# Patient Record
Sex: FEMALE | Race: WHITE | Employment: PART TIME | URBAN - METROPOLITAN AREA
[De-identification: names, ages, dates, MRNs, and addresses within clinical notes are randomized per-mention and may not be internally consistent; named-entity substitution may affect disease eponyms.]

---

## 2017-05-11 ENCOUNTER — OFFICE VISIT (OUTPATIENT)
Dept: PRIMARY CARE CLINIC | Age: 52
End: 2017-05-11

## 2017-05-11 VITALS
HEART RATE: 60 BPM | WEIGHT: 194 LBS | DIASTOLIC BLOOD PRESSURE: 88 MMHG | RESPIRATION RATE: 14 BRPM | TEMPERATURE: 98.7 F | SYSTOLIC BLOOD PRESSURE: 136 MMHG | HEIGHT: 70 IN | BODY MASS INDEX: 27.77 KG/M2

## 2017-05-11 DIAGNOSIS — R51.9 HEADACHE, UNSPECIFIED HEADACHE TYPE: ICD-10-CM

## 2017-05-11 DIAGNOSIS — I10 ESSENTIAL HYPERTENSION: Primary | ICD-10-CM

## 2017-05-11 DIAGNOSIS — Z12.39 ENCOUNTER FOR BREAST CANCER SCREENING OTHER THAN MAMMOGRAM: ICD-10-CM

## 2017-05-11 DIAGNOSIS — E78.5 DYSLIPIDEMIA: ICD-10-CM

## 2017-05-11 DIAGNOSIS — Z12.11 SCREENING FOR COLON CANCER: ICD-10-CM

## 2017-05-11 PROCEDURE — 99214 OFFICE O/P EST MOD 30 MIN: CPT | Performed by: FAMILY MEDICINE

## 2017-05-11 RX ORDER — RIZATRIPTAN BENZOATE 10 MG/1
10 TABLET ORAL
Qty: 9 TABLET | Refills: 5 | Status: SHIPPED | OUTPATIENT
Start: 2017-05-11 | End: 2017-11-21 | Stop reason: SDUPTHER

## 2017-05-11 RX ORDER — ATENOLOL 50 MG/1
50 TABLET ORAL DAILY
Qty: 90 TABLET | Refills: 1 | Status: SHIPPED | OUTPATIENT
Start: 2017-05-11 | End: 2018-01-16

## 2017-05-11 RX ORDER — RIZATRIPTAN BENZOATE 10 MG/1
TABLET ORAL
Refills: 5 | COMMUNITY
Start: 2017-04-25 | End: 2017-05-11 | Stop reason: SDUPTHER

## 2017-05-11 ASSESSMENT — PATIENT HEALTH QUESTIONNAIRE - PHQ9
SUM OF ALL RESPONSES TO PHQ QUESTIONS 1-9: 0
SUM OF ALL RESPONSES TO PHQ9 QUESTIONS 1 & 2: 0
1. LITTLE INTEREST OR PLEASURE IN DOING THINGS: 0
2. FEELING DOWN, DEPRESSED OR HOPELESS: 0

## 2017-05-11 ASSESSMENT — ENCOUNTER SYMPTOMS
SHORTNESS OF BREATH: 0
BLURRED VISION: 0
ORTHOPNEA: 0

## 2017-05-18 ENCOUNTER — HOSPITAL ENCOUNTER (OUTPATIENT)
Dept: WOMENS IMAGING | Age: 52
Discharge: HOME | End: 2017-05-18
Payer: MEDICAID

## 2017-05-18 DIAGNOSIS — Z12.39 ENCOUNTER FOR BREAST CANCER SCREENING OTHER THAN MAMMOGRAM: ICD-10-CM

## 2017-05-18 PROCEDURE — G0202 SCR MAMMO BI INCL CAD: HCPCS

## 2017-05-23 DIAGNOSIS — N63.0 BREAST NODULE: Primary | ICD-10-CM

## 2017-05-24 DIAGNOSIS — I10 ESSENTIAL HYPERTENSION: ICD-10-CM

## 2017-05-24 LAB
ALBUMIN SERPL-MCNC: 4.3 G/DL (ref 3.9–4.9)
ALP BLD-CCNC: 57 U/L (ref 40–130)
ALT SERPL-CCNC: 24 U/L (ref 0–33)
ANION GAP SERPL CALCULATED.3IONS-SCNC: 12 MEQ/L (ref 7–13)
AST SERPL-CCNC: 37 U/L (ref 0–35)
BASOPHILS ABSOLUTE: 0 K/UL (ref 0–0.2)
BASOPHILS RELATIVE PERCENT: 0.6 %
BILIRUB SERPL-MCNC: 0.9 MG/DL (ref 0–1.2)
BUN BLDV-MCNC: 8 MG/DL (ref 6–20)
CALCIUM SERPL-MCNC: 9 MG/DL (ref 8.6–10.2)
CHLORIDE BLD-SCNC: 102 MEQ/L (ref 98–107)
CHOLESTEROL, TOTAL: 215 MG/DL (ref 0–199)
CO2: 25 MEQ/L (ref 22–29)
CREAT SERPL-MCNC: 0.41 MG/DL (ref 0.5–0.9)
EOSINOPHILS ABSOLUTE: 0.2 K/UL (ref 0–0.7)
EOSINOPHILS RELATIVE PERCENT: 2.8 %
GFR AFRICAN AMERICAN: >60
GFR NON-AFRICAN AMERICAN: >60
GLOBULIN: 2.9 G/DL (ref 2.3–3.5)
GLUCOSE BLD-MCNC: 87 MG/DL (ref 74–109)
HCT VFR BLD CALC: 43.4 % (ref 37–47)
HDLC SERPL-MCNC: 52 MG/DL (ref 40–59)
HEMOGLOBIN: 13.8 G/DL (ref 12–16)
LDL CHOLESTEROL CALCULATED: 138 MG/DL (ref 0–129)
LYMPHOCYTES ABSOLUTE: 1.5 K/UL (ref 1–4.8)
LYMPHOCYTES RELATIVE PERCENT: 23.6 %
MCH RBC QN AUTO: 30.7 PG (ref 27–31.3)
MCHC RBC AUTO-ENTMCNC: 31.7 % (ref 33–37)
MCV RBC AUTO: 96.9 FL (ref 82–100)
MONOCYTES ABSOLUTE: 0.5 K/UL (ref 0.2–0.8)
MONOCYTES RELATIVE PERCENT: 7.3 %
NEUTROPHILS ABSOLUTE: 4.1 K/UL (ref 1.4–6.5)
NEUTROPHILS RELATIVE PERCENT: 65.7 %
PDW BLD-RTO: 12.4 % (ref 11.5–14.5)
PLATELET # BLD: 185 K/UL (ref 130–400)
POTASSIUM SERPL-SCNC: 3.9 MEQ/L (ref 3.5–5.1)
RBC # BLD: 4.48 M/UL (ref 4.2–5.4)
SODIUM BLD-SCNC: 139 MEQ/L (ref 132–144)
TOTAL PROTEIN: 7.2 G/DL (ref 6.4–8.1)
TRIGL SERPL-MCNC: 126 MG/DL (ref 0–200)
TSH SERPL DL<=0.05 MIU/L-ACNC: 2.78 UIU/ML (ref 0.27–4.2)
WBC # BLD: 6.2 K/UL (ref 4.8–10.8)

## 2017-05-31 ENCOUNTER — HOSPITAL ENCOUNTER (OUTPATIENT)
Dept: ULTRASOUND IMAGING | Age: 52
Discharge: HOME | End: 2017-05-31
Payer: MEDICAID

## 2017-05-31 ENCOUNTER — HOSPITAL ENCOUNTER (OUTPATIENT)
Dept: WOMENS IMAGING | Age: 52
Discharge: HOME | End: 2017-05-31
Payer: MEDICAID

## 2017-05-31 DIAGNOSIS — R92.8 ABNORMAL MAMMOGRAM: ICD-10-CM

## 2017-05-31 DIAGNOSIS — N63.0 BREAST NODULE: ICD-10-CM

## 2017-05-31 PROCEDURE — 76642 ULTRASOUND BREAST LIMITED: CPT

## 2017-05-31 PROCEDURE — G0279 TOMOSYNTHESIS, MAMMO: HCPCS

## 2017-06-12 DIAGNOSIS — N60.01 BREAST CYST, RIGHT: Primary | ICD-10-CM

## 2017-07-10 ENCOUNTER — OFFICE VISIT (OUTPATIENT)
Dept: SURGERY | Age: 52
End: 2017-07-10

## 2017-07-10 VITALS
TEMPERATURE: 98.6 F | BODY MASS INDEX: 27.2 KG/M2 | HEIGHT: 70 IN | WEIGHT: 190 LBS | SYSTOLIC BLOOD PRESSURE: 140 MMHG | DIASTOLIC BLOOD PRESSURE: 90 MMHG

## 2017-07-10 DIAGNOSIS — N60.09 BREAST CYST, UNSPECIFIED LATERALITY: Primary | ICD-10-CM

## 2017-07-10 PROCEDURE — 99212 OFFICE O/P EST SF 10 MIN: CPT | Performed by: SURGERY

## 2017-09-14 ENCOUNTER — TELEPHONE (OUTPATIENT)
Dept: PRIMARY CARE CLINIC | Age: 52
End: 2017-09-14

## 2017-09-15 RX ORDER — METOPROLOL TARTRATE 50 MG/1
50 TABLET, FILM COATED ORAL 2 TIMES DAILY
Qty: 60 TABLET | Refills: 3 | Status: SHIPPED | OUTPATIENT
Start: 2017-09-15 | End: 2018-01-16 | Stop reason: SDUPTHER

## 2017-11-21 RX ORDER — RIZATRIPTAN BENZOATE 10 MG/1
10 TABLET ORAL
Qty: 9 TABLET | Refills: 5 | Status: SHIPPED | OUTPATIENT
Start: 2017-11-21 | End: 2018-01-16 | Stop reason: SDUPTHER

## 2017-11-21 RX ORDER — RIZATRIPTAN BENZOATE 10 MG/1
TABLET ORAL
Qty: 9 TABLET | Refills: 0 | OUTPATIENT
Start: 2017-11-21

## 2018-01-16 ENCOUNTER — OFFICE VISIT (OUTPATIENT)
Dept: PRIMARY CARE CLINIC | Age: 53
End: 2018-01-16

## 2018-01-16 VITALS
BODY MASS INDEX: 27.92 KG/M2 | RESPIRATION RATE: 12 BRPM | WEIGHT: 195 LBS | DIASTOLIC BLOOD PRESSURE: 82 MMHG | HEART RATE: 60 BPM | TEMPERATURE: 98.5 F | SYSTOLIC BLOOD PRESSURE: 134 MMHG | HEIGHT: 70 IN

## 2018-01-16 DIAGNOSIS — L05.01 PILONIDAL CYST WITH ABSCESS: ICD-10-CM

## 2018-01-16 DIAGNOSIS — L05.01 PILONIDAL CYST WITH ABSCESS: Primary | ICD-10-CM

## 2018-01-16 DIAGNOSIS — L03.317 CELLULITIS OF BUTTOCK: ICD-10-CM

## 2018-01-16 DIAGNOSIS — Z12.11 SCREENING FOR COLON CANCER: ICD-10-CM

## 2018-01-16 PROCEDURE — 3017F COLORECTAL CA SCREEN DOC REV: CPT | Performed by: FAMILY MEDICINE

## 2018-01-16 PROCEDURE — 99214 OFFICE O/P EST MOD 30 MIN: CPT | Performed by: FAMILY MEDICINE

## 2018-01-16 PROCEDURE — 1036F TOBACCO NON-USER: CPT | Performed by: FAMILY MEDICINE

## 2018-01-16 PROCEDURE — G8484 FLU IMMUNIZE NO ADMIN: HCPCS | Performed by: FAMILY MEDICINE

## 2018-01-16 PROCEDURE — 3014F SCREEN MAMMO DOC REV: CPT | Performed by: FAMILY MEDICINE

## 2018-01-16 PROCEDURE — G8427 DOCREV CUR MEDS BY ELIG CLIN: HCPCS | Performed by: FAMILY MEDICINE

## 2018-01-16 PROCEDURE — G8419 CALC BMI OUT NRM PARAM NOF/U: HCPCS | Performed by: FAMILY MEDICINE

## 2018-01-16 RX ORDER — CIPROFLOXACIN 500 MG/1
500 TABLET, FILM COATED ORAL 2 TIMES DAILY
Qty: 20 TABLET | Refills: 0 | Status: SHIPPED | OUTPATIENT
Start: 2018-01-16 | End: 2018-01-26

## 2018-01-16 RX ORDER — RIZATRIPTAN BENZOATE 10 MG/1
10 TABLET ORAL
Qty: 9 TABLET | Refills: 5 | Status: SHIPPED | OUTPATIENT
Start: 2018-01-16 | End: 2018-12-03 | Stop reason: SDUPTHER

## 2018-01-16 RX ORDER — METOPROLOL TARTRATE 50 MG/1
50 TABLET, FILM COATED ORAL 2 TIMES DAILY
Qty: 60 TABLET | Refills: 5 | Status: SHIPPED | OUTPATIENT
Start: 2018-01-16 | End: 2018-07-20 | Stop reason: SDUPTHER

## 2018-01-16 ASSESSMENT — ENCOUNTER SYMPTOMS
COLOR CHANGE: 0
NAUSEA: 0
CONSTIPATION: 0
CHOKING: 0
WHEEZING: 0
PHOTOPHOBIA: 0
HEMATEMESIS: 0
STRIDOR: 0
EYE DISCHARGE: 0
EYE REDNESS: 0
CHEST TIGHTNESS: 0
ABDOMINAL PAIN: 0
RECTAL PAIN: 1
DIFFICULTY BREATHING: 0
EYE PAIN: 0
DIARRHEA: 0
HEMATOCHEZIA: 1
VOMITING: 0
COUGH: 0
APNEA: 0
FACIAL SWELLING: 0

## 2018-01-16 NOTE — PROGRESS NOTES
Subjective:      Patient ID: Leonard Isaacs is a 47 y.o. female who presents today for:  Chief Complaint   Patient presents with    Hemorrhoids     Possible external hemorrhoid since Saturday, 1/13/18. She had bleeding yesterday but didn't have it before then. She admits to: diarrhea (1 week ago). She admits to pain & swelling. States that it was very swollen Sunday & yesterday. After she had blood yesterday she states that the swelling went down some. She rates the pain today as: 4/10. Treatments: preparation H & tylenol - no relief. No PMH of hemorrhoids.  Health Maintenance     Has never had a Colonoscopy. States that after the hemorrhoids are resolved she is agreeable to get one. Refuses at this time. Rectal Bleeding    The current episode started 3 to 5 days ago. The onset was sudden. The problem occurs rarely. The problem has been gradually improving. The pain is moderate. The stool is described as hard. There was no prior successful therapy. Ineffective treatments: Preparation H, Tylenol. Associated symptoms include hemorrhoids (?) and rectal pain. Pertinent negatives include no anorexia, no fever, no abdominal pain, no diarrhea, no hematemesis, no nausea, no vomiting, no hematuria, no vaginal bleeding, no vaginal discharge, no chest pain, no headaches, no coughing, no difficulty breathing and no rash. Associated symptoms comments: constipation. She has been behaving normally. She has been eating and drinking normally. Urine output has been normal. There were no sick contacts.          Past Medical History:   Diagnosis Date    Allergic rhinitis 4/9/2014    Breast cyst 4/27/2015    Calculus of gallbladder without cholecystitis without obstruction 5/5/2016    Cellulitis of buttock 1/16/2018    Diarrhea, after Zpac 5/5/2014    Dyslipidemia 3/12/2015    Fibrocystic breast disease 4/27/2015    Headache(784.0)     Hematuria 6/26/2015    Hematuria, persistent 8/20/2015    Hypertension     Insomnia 3/12/2015    Pilonidal cyst with abscess, spontaneous D/C 1/16/2018    Pyuria 6/26/2015    Sinusitis 4/9/2014    UTI (urinary tract infection) 6/26/2015     Past Surgical History:   Procedure Laterality Date    APPENDECTOMY  2012    CYSTOSCOPY  10/21/15    FLEXI BLE    HYSTERECTOMY  2012     Family History   Problem Relation Age of Onset    High Blood Pressure Mother     High Blood Pressure Father      Social History     Social History    Marital status:      Spouse name: N/A    Number of children: N/A    Years of education: N/A     Occupational History    Not on file. Social History Main Topics    Smoking status: Never Smoker    Smokeless tobacco: Never Used    Alcohol use 0.0 oz/week      Comment: RARE    Drug use: No    Sexual activity: Yes     Other Topics Concern    Not on file     Social History Narrative    No narrative on file     Allergies:  Review of patient's allergies indicates no known allergies. Review of Systems   Constitutional: Negative for activity change, appetite change, chills, diaphoresis and fever. HENT: Negative for congestion, ear discharge, ear pain, facial swelling, hearing loss and mouth sores. Eyes: Negative for photophobia, pain, discharge and redness. Respiratory: Negative for apnea, cough, choking, chest tightness, wheezing and stridor. Cardiovascular: Negative for chest pain, palpitations and leg swelling. Gastrointestinal: Positive for hematochezia, hemorrhoids (?) and rectal pain. Negative for abdominal pain, anorexia, constipation, diarrhea, hematemesis, nausea and vomiting. Endocrine: Negative for cold intolerance, heat intolerance, polydipsia and polyphagia. Genitourinary: Negative for dysuria, frequency, hematuria, vaginal bleeding and vaginal discharge. Musculoskeletal: Negative for gait problem, joint swelling, neck pain and neck stiffness. Skin: Negative for color change, pallor, rash and wound. tablet by mouth 2 times daily     Dispense:  60 tablet     Refill:  5    ciprofloxacin (CIPRO) 500 MG tablet     Sig: Take 1 tablet by mouth 2 times daily for 10 days     Dispense:  20 tablet     Refill:  0       Controlled Substances Monitoring:      No Follow-up on file. I, Cherie Haider CMA   , am scribing for and in the presence of Cam Ahn DO. Electronically signed by :  Cherie Grajeda DO, personally performed the services described in this documentation, as scribed by Debby Luong CMA   in my presence, and it is both accurate and complete.  Electronically signed by: Cam Ahn DO    1/16/18 11:23 AM    Cam Ahn DO

## 2018-01-18 ENCOUNTER — OFFICE VISIT (OUTPATIENT)
Dept: PRIMARY CARE CLINIC | Age: 53
End: 2018-01-18

## 2018-01-18 VITALS
BODY MASS INDEX: 27.92 KG/M2 | HEART RATE: 60 BPM | RESPIRATION RATE: 14 BRPM | TEMPERATURE: 97.8 F | WEIGHT: 195 LBS | DIASTOLIC BLOOD PRESSURE: 90 MMHG | SYSTOLIC BLOOD PRESSURE: 150 MMHG | HEIGHT: 70 IN

## 2018-01-18 DIAGNOSIS — L03.317 CELLULITIS AND ABSCESS OF BUTTOCK: ICD-10-CM

## 2018-01-18 DIAGNOSIS — L05.91 PILONIDAL CYST: Primary | ICD-10-CM

## 2018-01-18 DIAGNOSIS — L02.31 CELLULITIS AND ABSCESS OF BUTTOCK: ICD-10-CM

## 2018-01-18 LAB
GRAM STAIN RESULT: ABNORMAL
ORGANISM: ABNORMAL
WOUND/ABSCESS: ABNORMAL
WOUND/ABSCESS: ABNORMAL

## 2018-01-18 PROCEDURE — 99213 OFFICE O/P EST LOW 20 MIN: CPT | Performed by: FAMILY MEDICINE

## 2018-01-18 PROCEDURE — 3014F SCREEN MAMMO DOC REV: CPT | Performed by: FAMILY MEDICINE

## 2018-01-18 PROCEDURE — G8427 DOCREV CUR MEDS BY ELIG CLIN: HCPCS | Performed by: FAMILY MEDICINE

## 2018-01-18 PROCEDURE — 1036F TOBACCO NON-USER: CPT | Performed by: FAMILY MEDICINE

## 2018-01-18 PROCEDURE — 3017F COLORECTAL CA SCREEN DOC REV: CPT | Performed by: FAMILY MEDICINE

## 2018-01-18 PROCEDURE — G8484 FLU IMMUNIZE NO ADMIN: HCPCS | Performed by: FAMILY MEDICINE

## 2018-01-18 PROCEDURE — G8419 CALC BMI OUT NRM PARAM NOF/U: HCPCS | Performed by: FAMILY MEDICINE

## 2018-01-18 ASSESSMENT — ENCOUNTER SYMPTOMS
EYE REDNESS: 0
CHOKING: 0
CHEST TIGHTNESS: 0
FACIAL SWELLING: 0
STRIDOR: 0
APNEA: 0
COLOR CHANGE: 0
WHEEZING: 0
EYE DISCHARGE: 0
PHOTOPHOBIA: 0
NAUSEA: 0
ABDOMINAL PAIN: 0
EYE PAIN: 0
DIARRHEA: 0
CONSTIPATION: 0
RECTAL PAIN: 1

## 2018-01-18 NOTE — PROGRESS NOTES
Review of patient's allergies indicates no known allergies. Review of Systems   Constitutional: Negative for activity change, appetite change, chills, diaphoresis and fever. HENT: Negative for congestion, ear discharge, ear pain, facial swelling, hearing loss and mouth sores. Eyes: Negative for photophobia, pain, discharge and redness. Respiratory: Negative for apnea, choking, chest tightness, wheezing and stridor. Cardiovascular: Negative for chest pain, palpitations and leg swelling. Gastrointestinal: Positive for rectal pain (improved). Negative for abdominal pain, constipation, diarrhea and nausea. Endocrine: Negative for cold intolerance, heat intolerance, polydipsia and polyphagia. Genitourinary: Negative for dysuria and frequency. Musculoskeletal: Negative for gait problem, joint swelling, neck pain and neck stiffness. Skin: Negative for color change, pallor, rash and wound. Allergic/Immunologic: Negative for immunocompromised state. Neurological: Negative for tremors, syncope, facial asymmetry, speech difficulty and headaches. Psychiatric/Behavioral: Negative for confusion and hallucinations. The patient is not nervous/anxious and is not hyperactive. Objective:   BP (!) 150/90 (Site: Left Arm, Position: Sitting, Cuff Size: Medium Adult)   Pulse 60   Temp 97.8 °F (36.6 °C) (Oral)   Resp 14   Ht 5' 10\" (1.778 m)   Wt 195 lb (88.5 kg)   LMP 01/02/2012   Breastfeeding? No   BMI 27.98 kg/m²     Physical Exam   Constitutional: She is oriented to person, place, and time. She appears well-developed and well-nourished. HENT:   Head: Normocephalic and atraumatic. Right Ear: External ear normal.   Left Ear: External ear normal.   Eyes: Conjunctivae and EOM are normal. Pupils are equal, round, and reactive to light. Neck: Normal range of motion. Neck supple. Genitourinary:         Neurological: She is alert and oriented to person, place, and time.    Skin: Skin is warm

## 2018-02-01 ENCOUNTER — OFFICE VISIT (OUTPATIENT)
Dept: PRIMARY CARE CLINIC | Age: 53
End: 2018-02-01
Payer: MEDICAID

## 2018-02-01 VITALS
SYSTOLIC BLOOD PRESSURE: 138 MMHG | DIASTOLIC BLOOD PRESSURE: 88 MMHG | BODY MASS INDEX: 28.06 KG/M2 | RESPIRATION RATE: 14 BRPM | WEIGHT: 196 LBS | HEIGHT: 70 IN | TEMPERATURE: 98.1 F | HEART RATE: 56 BPM

## 2018-02-01 DIAGNOSIS — L05.91 PILONIDAL CYST: Primary | ICD-10-CM

## 2018-02-01 DIAGNOSIS — L05.01 PILONIDAL CYST WITH ABSCESS: ICD-10-CM

## 2018-02-01 PROCEDURE — 3017F COLORECTAL CA SCREEN DOC REV: CPT | Performed by: FAMILY MEDICINE

## 2018-02-01 PROCEDURE — G8419 CALC BMI OUT NRM PARAM NOF/U: HCPCS | Performed by: FAMILY MEDICINE

## 2018-02-01 PROCEDURE — 99213 OFFICE O/P EST LOW 20 MIN: CPT | Performed by: FAMILY MEDICINE

## 2018-02-01 PROCEDURE — G8427 DOCREV CUR MEDS BY ELIG CLIN: HCPCS | Performed by: FAMILY MEDICINE

## 2018-02-01 PROCEDURE — G8484 FLU IMMUNIZE NO ADMIN: HCPCS | Performed by: FAMILY MEDICINE

## 2018-02-01 PROCEDURE — 3014F SCREEN MAMMO DOC REV: CPT | Performed by: FAMILY MEDICINE

## 2018-02-01 PROCEDURE — 1036F TOBACCO NON-USER: CPT | Performed by: FAMILY MEDICINE

## 2018-02-01 ASSESSMENT — ENCOUNTER SYMPTOMS
EYE PAIN: 0
APNEA: 0
ABDOMINAL PAIN: 0
FACIAL SWELLING: 0
STRIDOR: 0
PHOTOPHOBIA: 0
CHOKING: 0
EYE DISCHARGE: 0
DIARRHEA: 0
COLOR CHANGE: 0
WHEEZING: 0
NAUSEA: 0
CHEST TIGHTNESS: 0
CONSTIPATION: 0
EYE REDNESS: 0

## 2018-02-01 NOTE — PROGRESS NOTES
change, appetite change, chills, diaphoresis and fever. HENT: Negative for congestion, ear discharge, ear pain, facial swelling, hearing loss and mouth sores. Eyes: Negative for photophobia, pain, discharge and redness. Respiratory: Negative for apnea, choking, chest tightness, wheezing and stridor. Cardiovascular: Negative for chest pain, palpitations and leg swelling. Gastrointestinal: Negative for abdominal pain, constipation, diarrhea and nausea. Endocrine: Negative for cold intolerance, heat intolerance, polydipsia and polyphagia. Genitourinary: Negative for dysuria and frequency. Musculoskeletal: Negative for gait problem, joint swelling, neck pain and neck stiffness. Skin: Negative for color change, pallor, rash and wound. Allergic/Immunologic: Negative for immunocompromised state. Neurological: Negative for tremors, syncope, facial asymmetry, speech difficulty and headaches. Psychiatric/Behavioral: Negative for confusion and hallucinations. The patient is not nervous/anxious and is not hyperactive. Objective:   /88 (Site: Left Arm, Position: Sitting, Cuff Size: Large Adult)   Pulse 56   Temp 98.1 °F (36.7 °C)   Resp 14   Ht 5' 10\" (1.778 m)   Wt 196 lb (88.9 kg)   LMP 01/02/2012   BMI 28.12 kg/m²     Physical Exam   Constitutional: She is oriented to person, place, and time. She appears well-developed and well-nourished. HENT:   Head: Normocephalic and atraumatic. Nose: Nose normal.   Eyes: Conjunctivae and EOM are normal. Pupils are equal, round, and reactive to light. No scleral icterus. Neck: Normal range of motion. Neck supple. Cardiovascular: Normal rate, regular rhythm and normal heart sounds. Exam reveals no gallop and no friction rub. No murmur heard. Pulmonary/Chest: Effort normal and breath sounds normal. No respiratory distress. She has no wheezes. She has no rales. She exhibits no tenderness.    Neurological: She is alert and oriented to person, place, and time. Skin: Skin is warm and dry. No rash noted. No erythema. No pallor. Psychiatric: She has a normal mood and affect. Her behavior is normal. Judgment normal.   Nursing note and vitals reviewed. Assessment:     1. Pilonidal cyst, Resolved     2. Pilonidal cyst with abscess, spontaneous D/C, Resolved         Plan:      No orders of the defined types were placed in this encounter. No orders of the defined types were placed in this encounter. Controlled Substances Monitoring:      No Follow-up on file. ISilvestre CMA   , am scribing for and in the presence of Carina Gallardo DO. Electronically signed by :  Silvestre Coulter DO, personally performed the services described in this documentation, as scribed by Barrington Arboleda CMA   in my presence, and it is both accurate and complete.  Electronically signed by: Carina Gallardo DO    2/1/18 2:55 PM    Carina Gallardo DO

## 2018-05-29 ENCOUNTER — HOSPITAL ENCOUNTER (OUTPATIENT)
Dept: WOMENS IMAGING | Age: 53
Discharge: HOME | End: 2018-05-31
Payer: MEDICAID

## 2018-05-29 DIAGNOSIS — Z12.39 SCREENING BREAST EXAMINATION: ICD-10-CM

## 2018-05-29 PROCEDURE — 77067 SCR MAMMO BI INCL CAD: CPT

## 2018-07-23 RX ORDER — METOPROLOL TARTRATE 50 MG/1
TABLET, FILM COATED ORAL
Qty: 60 TABLET | Refills: 0 | Status: SHIPPED | OUTPATIENT
Start: 2018-07-23 | End: 2018-08-28 | Stop reason: SDUPTHER

## 2018-08-20 RX ORDER — METOPROLOL TARTRATE 50 MG/1
TABLET, FILM COATED ORAL
Qty: 60 TABLET | Refills: 0 | OUTPATIENT
Start: 2018-08-20

## 2018-08-28 ENCOUNTER — OFFICE VISIT (OUTPATIENT)
Dept: PRIMARY CARE CLINIC | Age: 53
End: 2018-08-28
Payer: MEDICAID

## 2018-08-28 VITALS
BODY MASS INDEX: 27.35 KG/M2 | RESPIRATION RATE: 16 BRPM | DIASTOLIC BLOOD PRESSURE: 78 MMHG | WEIGHT: 191 LBS | HEART RATE: 60 BPM | HEIGHT: 70 IN | TEMPERATURE: 98.7 F | SYSTOLIC BLOOD PRESSURE: 120 MMHG

## 2018-08-28 DIAGNOSIS — E78.5 DYSLIPIDEMIA: ICD-10-CM

## 2018-08-28 DIAGNOSIS — I10 ESSENTIAL HYPERTENSION: ICD-10-CM

## 2018-08-28 DIAGNOSIS — I10 ESSENTIAL HYPERTENSION: Primary | ICD-10-CM

## 2018-08-28 LAB
ALBUMIN SERPL-MCNC: 4.5 G/DL (ref 3.9–4.9)
ALP BLD-CCNC: 71 U/L (ref 40–130)
ALT SERPL-CCNC: 26 U/L (ref 0–33)
ANION GAP SERPL CALCULATED.3IONS-SCNC: 13 MEQ/L (ref 7–13)
AST SERPL-CCNC: 22 U/L (ref 0–35)
BASOPHILS ABSOLUTE: 0 K/UL (ref 0–0.2)
BASOPHILS RELATIVE PERCENT: 0.6 %
BILIRUB SERPL-MCNC: 1 MG/DL (ref 0–1.2)
BUN BLDV-MCNC: 10 MG/DL (ref 6–20)
CALCIUM SERPL-MCNC: 9.1 MG/DL (ref 8.6–10.2)
CHLORIDE BLD-SCNC: 102 MEQ/L (ref 98–107)
CHOLESTEROL, TOTAL: 211 MG/DL (ref 0–199)
CO2: 26 MEQ/L (ref 22–29)
CREAT SERPL-MCNC: 0.44 MG/DL (ref 0.5–0.9)
EOSINOPHILS ABSOLUTE: 0.1 K/UL (ref 0–0.7)
EOSINOPHILS RELATIVE PERCENT: 1.5 %
GFR AFRICAN AMERICAN: >60
GFR NON-AFRICAN AMERICAN: >60
GLOBULIN: 2.9 G/DL (ref 2.3–3.5)
GLUCOSE BLD-MCNC: 93 MG/DL (ref 74–109)
HCT VFR BLD CALC: 39.3 % (ref 37–47)
HDLC SERPL-MCNC: 45 MG/DL (ref 40–59)
HEMOGLOBIN: 13.4 G/DL (ref 12–16)
LDL CHOLESTEROL CALCULATED: 124 MG/DL (ref 0–129)
LYMPHOCYTES ABSOLUTE: 1.8 K/UL (ref 1–4.8)
LYMPHOCYTES RELATIVE PERCENT: 25.7 %
MCH RBC QN AUTO: 32.4 PG (ref 27–31.3)
MCHC RBC AUTO-ENTMCNC: 34.1 % (ref 33–37)
MCV RBC AUTO: 94.9 FL (ref 82–100)
MONOCYTES ABSOLUTE: 0.4 K/UL (ref 0.2–0.8)
MONOCYTES RELATIVE PERCENT: 6.4 %
NEUTROPHILS ABSOLUTE: 4.5 K/UL (ref 1.4–6.5)
NEUTROPHILS RELATIVE PERCENT: 65.8 %
PDW BLD-RTO: 13.2 % (ref 11.5–14.5)
PLATELET # BLD: 204 K/UL (ref 130–400)
POTASSIUM SERPL-SCNC: 3.6 MEQ/L (ref 3.5–5.1)
RBC # BLD: 4.14 M/UL (ref 4.2–5.4)
SODIUM BLD-SCNC: 141 MEQ/L (ref 132–144)
TOTAL PROTEIN: 7.4 G/DL (ref 6.4–8.1)
TRIGL SERPL-MCNC: 210 MG/DL (ref 0–200)
TSH SERPL DL<=0.05 MIU/L-ACNC: 2.77 UIU/ML (ref 0.27–4.2)
WBC # BLD: 6.8 K/UL (ref 4.8–10.8)

## 2018-08-28 PROCEDURE — 3017F COLORECTAL CA SCREEN DOC REV: CPT | Performed by: FAMILY MEDICINE

## 2018-08-28 PROCEDURE — G8419 CALC BMI OUT NRM PARAM NOF/U: HCPCS | Performed by: FAMILY MEDICINE

## 2018-08-28 PROCEDURE — 99213 OFFICE O/P EST LOW 20 MIN: CPT | Performed by: FAMILY MEDICINE

## 2018-08-28 PROCEDURE — G8427 DOCREV CUR MEDS BY ELIG CLIN: HCPCS | Performed by: FAMILY MEDICINE

## 2018-08-28 PROCEDURE — 1036F TOBACCO NON-USER: CPT | Performed by: FAMILY MEDICINE

## 2018-08-28 RX ORDER — METOPROLOL TARTRATE 50 MG/1
TABLET, FILM COATED ORAL
Qty: 60 TABLET | Refills: 5 | Status: SHIPPED | OUTPATIENT
Start: 2018-08-28 | End: 2019-03-13 | Stop reason: SDUPTHER

## 2018-08-28 ASSESSMENT — ENCOUNTER SYMPTOMS
CHOKING: 0
EYE DISCHARGE: 0
COLOR CHANGE: 0
STRIDOR: 0
EYE REDNESS: 0
NAUSEA: 0
CHEST TIGHTNESS: 0
BLURRED VISION: 0
ABDOMINAL PAIN: 0
SHORTNESS OF BREATH: 0
EYE PAIN: 0
FACIAL SWELLING: 0
WHEEZING: 0
CONSTIPATION: 0
DIARRHEA: 0
ORTHOPNEA: 0
APNEA: 0
PHOTOPHOBIA: 0

## 2018-08-28 ASSESSMENT — PATIENT HEALTH QUESTIONNAIRE - PHQ9
SUM OF ALL RESPONSES TO PHQ QUESTIONS 1-9: 0
1. LITTLE INTEREST OR PLEASURE IN DOING THINGS: 0
SUM OF ALL RESPONSES TO PHQ QUESTIONS 1-9: 0
2. FEELING DOWN, DEPRESSED OR HOPELESS: 0
SUM OF ALL RESPONSES TO PHQ9 QUESTIONS 1 & 2: 0

## 2018-08-28 NOTE — PROGRESS NOTES
 Not on file. Social History Main Topics    Smoking status: Never Smoker    Smokeless tobacco: Never Used    Alcohol use 0.0 oz/week      Comment: RARE    Drug use: No    Sexual activity: Yes     Other Topics Concern    Not on file     Social History Narrative    No narrative on file     Allergies:  Patient has no known allergies. Review of Systems   Constitutional: Negative for activity change, appetite change, chills, diaphoresis, fatigue, fever and malaise/fatigue. HENT: Negative for congestion, ear discharge, ear pain, facial swelling, hearing loss and mouth sores. Eyes: Negative for blurred vision, photophobia, pain, discharge and redness. Respiratory: Negative for apnea, choking, chest tightness, shortness of breath, wheezing and stridor. Cardiovascular: Negative for chest pain, palpitations, orthopnea, leg swelling and PND. Gastrointestinal: Negative for abdominal pain, constipation, diarrhea and nausea. Endocrine: Negative for cold intolerance, heat intolerance, polydipsia and polyphagia. Genitourinary: Negative for dysuria and frequency. Musculoskeletal: Negative for gait problem, joint swelling, neck pain and neck stiffness. Skin: Negative for color change, pallor, rash and wound. Allergic/Immunologic: Negative for immunocompromised state. Neurological: Negative for tremors, syncope, facial asymmetry, speech difficulty and headaches. Psychiatric/Behavioral: Negative for confusion and hallucinations. The patient is not nervous/anxious and is not hyperactive. Objective:   /78 (Site: Right Arm, Position: Sitting, Cuff Size: Medium Adult)   Pulse 60   Temp 98.7 °F (37.1 °C) (Oral)   Resp 16   Ht 5' 10\" (1.778 m)   Wt 191 lb (86.6 kg)   LMP 01/02/2012   BMI 27.41 kg/m²     Physical Exam   Constitutional: She is oriented to person, place, and time. She appears well-developed and well-nourished. HENT:   Head: Normocephalic and atraumatic.    Right Ear: Hearing, tympanic membrane, external ear and ear canal normal. No drainage or tenderness. Left Ear: Hearing, tympanic membrane, external ear and ear canal normal. No drainage. Mouth/Throat: Oropharynx is clear and moist. No oropharyngeal exudate. Eyes: Pupils are equal, round, and reactive to light. Conjunctivae and EOM are normal. Right eye exhibits no discharge. Left eye exhibits no discharge. No scleral icterus. Neck: Normal range of motion. Neck supple. No tracheal deviation present. No thyromegaly present. Cardiovascular: Normal rate, regular rhythm, normal heart sounds and intact distal pulses. Exam reveals no gallop and no friction rub. No murmur heard. Pulmonary/Chest: Effort normal and breath sounds normal. No respiratory distress. She has no wheezes. She has no rales. She exhibits no tenderness. Abdominal: Soft. Bowel sounds are normal. She exhibits no distension and no mass. There is no tenderness. There is no rebound and no guarding. Musculoskeletal: She exhibits no edema. Lymphadenopathy:     She has no cervical adenopathy. Neurological: She is alert and oriented to person, place, and time. Coordination normal.   Skin: Skin is warm and dry. No rash noted. She is not diaphoretic. No erythema. Psychiatric: She has a normal mood and affect. Her behavior is normal. Judgment and thought content normal.   Nursing note and vitals reviewed. Assessment:      Diagnosis Orders   1. Essential hypertension  metoprolol tartrate (LOPRESSOR) 50 MG tablet    CBC Auto Differential    Comprehensive Metabolic Panel    Lipid Panel    TSH without Reflex   2.  Dyslipidemia  Lipid Panel       Plan:      Orders Placed This Encounter   Procedures    CBC Auto Differential     Standing Status:   Future     Standing Expiration Date:   8/28/2019    Comprehensive Metabolic Panel     Standing Status:   Future     Standing Expiration Date:   8/28/2019    Lipid Panel     Standing Status:   Future

## 2018-12-04 RX ORDER — RIZATRIPTAN BENZOATE 10 MG/1
TABLET ORAL
Qty: 9 TABLET | Refills: 3 | Status: SHIPPED | OUTPATIENT
Start: 2018-12-04

## 2019-03-13 DIAGNOSIS — I10 ESSENTIAL HYPERTENSION: ICD-10-CM

## 2019-03-14 RX ORDER — METOPROLOL TARTRATE 50 MG/1
TABLET, FILM COATED ORAL
Qty: 60 TABLET | Refills: 5 | Status: SHIPPED | OUTPATIENT
Start: 2019-03-14

## 2019-05-06 ENCOUNTER — TELEPHONE (OUTPATIENT)
Dept: PRIMARY CARE CLINIC | Age: 54
End: 2019-05-06

## 2019-05-06 NOTE — TELEPHONE ENCOUNTER
Spoke with patient. Will call back to schedule appointment. Patient eligible for AWV. Patient cannot follow  to [facility] due to insurance coverage.

## 2022-04-05 ENCOUNTER — HOSPITAL ENCOUNTER (OUTPATIENT)
Dept: PHYSICAL THERAPY | Age: 57
Setting detail: THERAPIES SERIES
Discharge: HOME | End: 2022-04-05
Payer: MEDICAID

## 2022-04-05 PROCEDURE — 97162 PT EVAL MOD COMPLEX 30 MIN: CPT

## 2022-04-05 ASSESSMENT — PAIN SCALES - GENERAL: PAINLEVEL_OUTOF10: 5

## 2022-04-05 ASSESSMENT — PAIN DESCRIPTION - DESCRIPTORS: DESCRIPTORS: ACHING

## 2022-04-05 ASSESSMENT — PAIN DESCRIPTION - ONSET: ONSET: SUDDEN

## 2022-04-05 ASSESSMENT — PAIN DESCRIPTION - FREQUENCY: FREQUENCY: INTERMITTENT

## 2022-04-05 ASSESSMENT — PAIN - FUNCTIONAL ASSESSMENT: PAIN_FUNCTIONAL_ASSESSMENT: PREVENTS OR INTERFERES WITH ALL ACTIVE AND SOME PASSIVE ACTIVITIES

## 2022-04-05 ASSESSMENT — PAIN DESCRIPTION - LOCATION: LOCATION: KNEE

## 2022-04-05 ASSESSMENT — PAIN DESCRIPTION - ORIENTATION: ORIENTATION: RIGHT

## 2022-04-05 NOTE — PROGRESS NOTES
[address]  [address]  OULJJ:[phone]    [] Certification  [] Recertification [x]  Plan of Care  [] Progress Note [] Discharge      To:  Dr Jed Alvarez      From:  Gieslla Nagel PT  Patient: Shahram Rosa     : 1965  Diagnosis: R knee pain     Date: 2022  Treatment Diagnosis: R knee pain with functional limitations     Progress Report Period from:  2022  to 2022    Total # of Visits to Date: 1   No Show: 0    Canceled Appointment: 0     OBJECTIVE:   Short Term Goals - Time Frame for Short term goals: 2-3 weeks    Goals Current/Discharge status  Met   Short term goal 1: Decrease R knee  pain 50% to assist with improved functional gains. Pain Location: Knee    Pain Level: 5 (with sit to stand, stair climbing)    Pain Descriptors: Aching [] yes  [x] no   Short term goal 2: Pt to perform SLS B LE 10 sec without UE assist to improve overall dynamic balance during gait. Single Leg Stance R Leg: 3  Single Leg Stance L Leg: 10  Comments: toe heel raise increase posterior knee pain, guarding with squatting and unable to complete [] yes  [x] no     Long Term Goals - Time Frame for Long term goals : 4-6 weeks  Goals Current/ Discharge status Met   Long term goal 1: Indep/compliance HEP for symptom management Written HEP initiated  for symptom management  Needs progression for comprehensive program development. [] yes  [x] no   Long term goal 2: Pt demo improved overall function by reporting greater than 75% per functional survey score Exam: LEFS 46/80=59% functional   [] yes  [x] no   Long term goal 3: Pain-free R knee AROM to 0-130° allowing an increase in ADL tolerance.  RLE General PROM: Flex 135 limited by discomfort  RLE General AROM: Hip 85, Knee -     [] yes  [x] no   Long term goal 4: Improve R LE strength 4/5 to allow patient to recip stair climb Strength RLE  Comment: Hip 4-/5, knee 3/5, ankle 4-/5  Strength LLE  Comment: Hip 4/5, knee 4+/5, ankle 4/5    [] yes  [x] no   Long term goal 5: Ambulate with safe/Indep community distances with min to no deviations with equal wt shift. Ambulation 1  Device: No Device  Assistance: Independent  Quality of Gait: unequal wt shift  Gait Deviations: Slow Guillermina  Distance: 50' x 2   [] yes  [x] no      Body structures, Functions, Activity limitations: Decreased functional mobility ,Increased pain,Decreased posture,Decreased ROM,Decreased strength,Decreased balance  Assessment: The pt's impairments currently limit functional abilities by 42% including her abilities to walk, squat, stair climb, perform recreational activities, and perform household/work related duties without pain or limitations. Skilled PT required to address about deficits to improve over function and return to prior level of function. Prognosis: Good  Discharge Recommendations: Continue to assess pending progress  Special test: varus stress(-), valgus stress(-), Wisam's(mid guarding), Apleys Compression()/Distraction(min discomfort ), Apprehension (+), patellar grind (guarded), Thessaly (unable to complete)    PT Education: Goals;PT Role;Plan of Care  Patient Education: discussed use of ice and elevation R knee    PLAN: [x] Evaluate and Treat  Frequency/Duration:  Plan  Times per week: 2-3  Plan weeks: 4-6  Current Treatment Recommendations: Strengthening,ROM,Home Exercise Program,Aquatics,Stair training,Modalities,Gait Training,Balance Training,Manual Therapy - Soft Tissue Mobilization     Precautions: post swelling, guarded knee with special testing, further testing may be warranted pending progress                            Patient Status:[x] Continue/ Initiate plan of Care    [] Discharge PT. Recommend pt continue with HEP.      [] Additional visits requested, Please re-certify for additional visits:          Signature: Electronically signed by Janey Lehman PT on 4/5/22 at 1:05 PM EDT      If you have any questions or concerns, please don't hesitate to call. Thank you for your referral.    I have reviewed this plan of care and certify a need for medically necessary rehabilitation services.     Physician Signature:__________________________________________________________  Date:  Please sign and return

## 2022-04-05 NOTE — PROGRESS NOTES
[facility]) Physical Therapy-  [place]  PHYSICAL THERAPY EVALUATION    Date: 2022  Patient Name: Mychal Stapleton       MRN: [id number]   Account: [de-identified]   : 1965  (55 y.o.)   Gender: female   Referring Practitioner: Dr Virginia Sousa                 Diagnosis: R knee pain  Treatment Diagnosis: R knee pain with functional limitations  Additional Pertinent Hx: HTN,      Past Medical History:  has a past medical history of Allergic rhinitis, Breast cyst, Calculus of gallbladder without cholecystitis without obstruction, Cellulitis of buttock, Diarrhea, after Zpac, Dyslipidemia, Fibrocystic breast disease, Headache(784.0), Hematuria, Hematuria, persistent, Hypertension, Insomnia, Pilonidal cyst, Pilonidal cyst with abscess, spontaneous D/C, Pilonidal cyst, Positive E-coli, Pyuria, Sinusitis, and UTI (urinary tract infection). Past Surgical History:   has a past surgical history that includes Hysterectomy (); Appendectomy (); and Cystoscopy (10/21/15). Vital Signs  Patient Currently in Pain: Yes   Pain Screening  Patient Currently in Pain: Yes  Pain Assessment  Pain Assessment: 0-10  Pain Level: 5 (with sit to stand, stair climbing)  Pain Location: Knee  Pain Orientation: Right  Pain Radiating Towards: denies NT, pain medial and lateral  Pain Descriptors: Aching  Pain Frequency: Intermittent  Pain Onset: Sudden (wakes up at night)  Functional Pain Assessment: Prevents or interferes with all active and some passive activities (Pt reports 50% current function compared to 100% before 2022. 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 Had difficulty straightening knee and sig swelling. Pt has been icing it and heating alternately. Pt wore a brace for a week without change noted.   CLick noted intermittently  Comments: RTD May 2022    Objective:      Balance  Single Leg Stance R Leg: 3  Single Leg Stance L Leg: 10  Comments: toe heel raise increase posterior knee pain, guarding with squatting and unable to complete       Ambulation 1  Device: No Device  Assistance: Independent  Quality of Gait: unequal wt shift  Gait Deviations: Slow Guillermina  Distance: 50' x 2  Stairs  # Steps : 2  Stairs Height: 6\"  Rails: Right ascending     Transfers  Sit to Stand: Modified independent  Stand to sit: Modified independent    Strength RLE  Comment: Hip 4-/5, knee 3/5, ankle 4-/5  Strength LLE  Comment: Hip 4/5, knee 4+/5, ankle 4/5     PROM RLE (degrees)  RLE General PROM: Flex 135 limited by discomfort  AROM RLE (degrees)  RLE General AROM: Hip 85, Knee -     AROM LLE (degrees)  LLE General AROM: Knee 0-145, Hip flex 85     Observation/Palpation  Posture: Fair  Palpation: tederness posterior knee  Observation: unequal wt shift, mild decrease ext in stance  Edema: posterior knee  Bed mobility  Rolling to Left: Independent  Rolling to Right: Independent  Supine to Sit: Independent  Sit to Supine: Independent     Additional Measures  Flexibility: Hamstring WNL  Special Tests: varus stress(-), valgus stress(-), Wisam's(mid guarding), Apleys Compression()/Distraction(min discomfort ), Apprehension (+), patellar grind (guarded), Thessaly (unable to complete)     Exercises:   Exercises  Exercise 2: QS, heel slide, SLR*  Exercise 3: hip abd/circles*  Exercise 4: hip add with ball*  Exercise 5: hip abd with ball*  Exercise 6: ham isometric*  Exercise 20: HEP: to be intiated  Modalities:  Modalities  Cryotherapy (Minutes\Location): *with elevation (can use quad 7 for swelling)  E-stim (parameters): *  Manual:  Manual therapy  Joint mobilization: tibial distraction and post tibial glide*  Soft Tissue Mobalization: post knee*  *Indicates exercise,modality, or manual techniques to be initiated when appropriate  Assessment: Body structures, Functions, Activity limitations: Decreased functional mobility ,Increased pain,Decreased posture,Decreased ROM,Decreased strength,Decreased balance  Assessment: The pt's impairments currently limit functional abilities by 42% including her abilities to walk, squat, stair climb, perform recreational activities, and perform household/work related duties without pain or limitations. Skilled PT required to address about deficits to improve over function and return to prior level of function. Prognosis: Good  Discharge Recommendations: Continue to assess pending progress        Decision Making: Medium Complexity  History: HTN  Exam: LEFS 46/80=59% functional  Clinical Presentation: evolving        Plan  Frequency/Duration:  Plan  Times per week: 2-3  Plan weeks: 4-6  Current Treatment Recommendations: Strengthening,ROM,Home Exercise Program,Aquatics,Stair training,Modalities,Gait Training,Balance Training,Manual Therapy - Soft Tissue Mobilization     Patient Education  New Education Provided: PT Education: Goals;PT Role;Plan of Care  Patient Education: discussed use of ice and elevation R knee    POST-PAIN     Pain Rating (0-10 pain scale): No change/10  Location and pain description same as pre-treatment unless indicated. Action: [x] NA  [] Call Physician  [] Perform HEP  [] Meds as prescribed    Evaluation and patient rights have been reviewed and patient agrees with plan of care.   Yes  [x]  No  []   Explain: Navarro Fall Risk Assessment  Risk Factor Scale  Score   History of Falls [] Yes  [x] No 25  0 0   Secondary Diagnosis [] Yes  [x] No 15  0 0   Ambulatory Aid [] Furniture  [] Crutches/cane/walker  [x] None/bedrest/wheelchair/nurse 30  15  0 0   IV/Heparin Lock [] Yes  [x] No 20  0 0   Gait/Transferring [] Impaired  [x] Weak  [] Normal/bedrest/immobile 20  10  0 10   Mental Status [] Forgets limitations  [x] Oriented to own ability 15  0 0      Total:10     Based on the Assessment score: check the appropriate box. [x]  No intervention needed   Low =   Score of 0-24  []  Use standard prevention interventions Moderate =  Score of 24-44   [] Discuss fall prevention strategies   [] Indicate moderate falls risk on eval  []  Use high risk prevention interventions High = Score of 45 and higher   [] Discuss fall prevention strategies   [] Provide supervision during treatment time    Goals  Short term goals  Time Frame for Short term goals: 2-3 weeks  Short term goal 1: Decrease R knee  pain 50% to assist with improved functional gains. Short term goal 2: Pt to perform SLS B LE 10 sec without UE assist to improve overall dynamic balance during gait. Long term goals  Time Frame for Long term goals : 4-6 weeks  Long term goal 1: Indep/compliance HEP for symptom management  Long term goal 2: Pt demo improved overall function by reporting greater than 75% per functional survey score  Long term goal 3: Pain-free R knee AROM to 0-130° allowing an increase in ADL tolerance. Long term goal 4: Improve R LE strength 4/5 to allow patient to recip stair climb  Long term goal 5: Ambulate with safe/Indep community distances with min to no deviations with equal wt shift.     PT Individual Minutes  Time In: 2264  Time Out: 1140  Minutes: 35     Procedure Minutes:35 min Eval      Electronically signed by Martha Bustillo PT on 4/5/22 at 2:25 PM EDT

## 2022-04-26 ENCOUNTER — HOSPITAL ENCOUNTER (OUTPATIENT)
Dept: PHYSICAL THERAPY | Age: 57
Setting detail: THERAPIES SERIES
Discharge: HOME | End: 2022-04-26
Payer: MEDICAID

## 2022-04-26 PROCEDURE — G0283 ELEC STIM OTHER THAN WOUND: HCPCS

## 2022-04-26 PROCEDURE — 97110 THERAPEUTIC EXERCISES: CPT

## 2022-04-26 ASSESSMENT — PAIN DESCRIPTION - ORIENTATION: ORIENTATION: RIGHT

## 2022-04-26 ASSESSMENT — PAIN DESCRIPTION - LOCATION: LOCATION: KNEE

## 2022-04-26 ASSESSMENT — PAIN DESCRIPTION - DESCRIPTORS: DESCRIPTORS: ACHING

## 2022-04-26 ASSESSMENT — PAIN SCALES - GENERAL: PAINLEVEL_OUTOF10: 6

## 2022-04-26 ASSESSMENT — PAIN DESCRIPTION - FREQUENCY: FREQUENCY: INTERMITTENT

## 2022-04-26 NOTE — PROGRESS NOTES
[address] [place] [address]  [facility] [address]  ADWGQ:[phone]  Treatment Note        Date: 2022  Patient: Anny Levine  : 1965   Confirmed: Yes  MRN: [id number]  Referring Provider: Gely Romo MD   Secondary Referring Provider (If applicable):     Medical Diagnosis: Pain in right knee [M25.561]    Treatment Diagnosis: R knee pain with functional limitations    Visit Information:  Insurance: Payor: Autumn López / Plan: Autumn López / Product Type: *No Product type* /   PT Visit Information  Onset Date: 22  Total # of Visits Approved:  (EVAL ONLY,,,do not schedule/tx,ca @ 100% allowed amount DO NOT exceed 30 visits MAX* with auth only)  Total # of Visits to Date: 2  Plan of Care/Certification Expiration Date: 07/10/22  No Show: 0  Canceled Appointment: 0  Progress Note Counter: - visits (  units 22-7/10/22)    Subjective Information:  Subjective: Pt stated that walking and bending the R knee the pain will increase. Just sitting does not bother the pt. Pt c/o of R lateral knee pain and in the back of the knee. HEP Compliance:  [x] Good [] Fair [] Poor [] Reports not doing due to:    Pain Screening  Patient Currently in Pain: Yes  Pain Level: 6  Pain Location: Knee  Pain Orientation: Right  Pain Descriptors: Aching  Pain Frequency: Intermittent    Treatment:  Exercises:  Exercises  Exercise 2: QS  x10 each 3 sec  , heel slide  x 5 10 secs,  Exercise 4: hip add with ball x10 3 secs  Exercise 7: SLR  x 5  3 secs  : add quad set with SLR x 5  x3  sec hold    L:  x 5 5secs with quad set SLR  Exercise 20: HEP: updated 2022    Modalities:  Cryotherapy (CPT 54323)  Patient Position: Supine  Number Minutes Cryotherapy: 10 mins  Cryotherapy location: Right,Knee  Electric stimulation, unattended (CPT D312850) /  (Medicare)  Patient Position: Supine  E-stim location: Right,Knee  E-stim type:  Interferential (IFC)  Vasopneumatic Device (CPT L9902735)  Patient Position: Supine  Vasopneumatic Specified Location: R knee  Pre-Girth Measurement: 42.5 CM  Post-Girth Measurement: 42CM     *Indicates exercise, modality, or manual techniques to be initiated when appropriate    Objective Measures:       Strength: [x] NT  [] MMT completed:     ROM: [] NT  [x] ROM measurements:     AROM LLE (degrees)  LLE General AROM: Knee 0-145, Hip flex 85   AROM RLE (degrees)  RLE General AROM: Knee -6-125      PROM RLE (degrees)  RLE General PROM: Flex 135 limited by discomfort      Assessment: Body Structures, Functions, Activity Limitations Requiring Skilled Therapeutic Intervention: Decreased functional mobility ,Increased pain,Decreased posture,Decreased ROM,Decreased strength,Decreased balance  Assessment: Initiated R knee exercises with ROM and strengthening exercises. VC's to complete quad sets heel, slides and SLR correctly. Educated pt on knee anatomy, function, and goals to reach. IFC, quad 7 with medium compression to manage pain levels and swelling. Decrease pain noted post tx. Treatment Diagnosis: R knee pain with functional limitations  Therapy Prognosis: Good      Post-Pain Assessment:       Pain Rating (0-10 pain scale):  4-5 /10   Location and pain description same as pre-treatment unless indicated. Action: [] NA   [x] Perform HEP  [x] Meds as prescribed  [] Modalities as prescribed   [] Call Physician     GOALS   Patient Goal(s): Patient goals : Decrease Pain    Short Term Goals Completed by 2-3 weeks Goal Status   STG 1 Decrease R knee  pain 50% to assist with improved functional gains. Not Met   STG 2 Pt to perform SLS B LE 10 sec without UE assist to improve overall dynamic balance during gait.  Not Met       Long Term Goals Completed by 4-6 weeks Goal Status   LTG 1 Indep/compliance HEP for symptom management Not Met   LTG 2 Pt demo improved overall function by reporting greater than 75% per functional survey score Not Met   LTG 3 Pain-free R knee AROM to 0-130° allowing an increase in ADL tolerance. Not Met   LTG 4 Improve R LE strength 4/5 to allow patient to recip stair climb Not Met   LTG 5 Ambulate with safe/Indep community distances with min to no deviations with equal wt shift. Not Met        Plan:  Frequency/Duration:  Plan  Plan Frequency: 2-3 times a week  Plan weeks: 4-6 weeks  Current Treatment Recommendations: Strengthening,ROM,Home exercise program,Aquatics,Stair training,Modalities,Gait training,Manual Therapy - Soft Tissue Mobilization,Balance training  Pt to continue current HEP. See objective section for any therapeutic exercise changes, additions or modifications this date.     Therapy Time:      PT Individual Minutes  Time In: 1100  Time Out: 1200  Minutes: 60  Timed Code Treatment Minutes: 45 Minutes  Procedure Minutes:ifc R knee with cold med compression quad 7  Timed Activity Minutes Units   Ther Ex 45 3     Electronically signed by Mathew Ortiz PTA on 4/26/22 at 12:54 PM EDT

## 2022-05-03 ENCOUNTER — HOSPITAL ENCOUNTER (OUTPATIENT)
Dept: PHYSICAL THERAPY | Age: 57
Setting detail: THERAPIES SERIES
Discharge: HOME | End: 2022-05-03
Payer: MEDICAID

## 2022-05-03 PROCEDURE — G0283 ELEC STIM OTHER THAN WOUND: HCPCS

## 2022-05-03 PROCEDURE — 97110 THERAPEUTIC EXERCISES: CPT

## 2022-05-03 ASSESSMENT — PAIN DESCRIPTION - ORIENTATION: ORIENTATION: RIGHT

## 2022-05-03 ASSESSMENT — PAIN SCALES - GENERAL: PAINLEVEL_OUTOF10: 5

## 2022-05-03 ASSESSMENT — PAIN DESCRIPTION - LOCATION: LOCATION: KNEE

## 2022-05-03 ASSESSMENT — PAIN DESCRIPTION - DESCRIPTORS: DESCRIPTORS: ACHING

## 2022-05-03 NOTE — PROGRESS NOTES
[address] [place] [address]  [address]  YIBAW:[phone]  Treatment Note        Date: 5/3/2022  Patient: Ange Zimmerman  : 1965   Confirmed: Yes  MRN: [id number]  Referring Provider: Mary Anne Hubbard MD   Secondary Referring Provider (If applicable):     Medical Diagnosis: Pain in right knee [M25.561]    Treatment Diagnosis: R knee pain with functional limitations    Visit Information:  Insurance: Payor: Orlin Kline / Plan: Orlin BiedisonVend / Product Type: *No Product type* /   PT Visit Information  Onset Date: 22  Total # of Visits Approved:  (EVAL ONLY,,,do not schedule/tx,ca @ 100% allowed amount DO NOT exceed 30 visits MAX* with auth only)  Total # of Visits to Date: 3  Plan of Care/Certification Expiration Date: 07/10/22  No Show: 0  Canceled Appointment: 0  Progress Note Counter: - visits (  units 22-7/10/22)    Subjective Information:  Subjective: Patient states increased pain only when walking this date. Patient reports increased swelling in front and on top of her knee.   HEP Compliance:  [x] Good [] Fair [] Poor [] Reports not doing due to:    Pain Screening  Patient Currently in Pain: Yes  Pain Assessment: 0-10  Pain Level: 5  Pain Location: Knee  Pain Orientation: Right  Pain Descriptors: Aching    Treatment:  Exercises:  Exercises  Exercise 2: QS  x10 each 3 sec  , heel slide  x 5 10 secs,  Exercise 3: hip abd x 10 3-5\" , Hip circles x 10 ea  Exercise 4: hip add with ball x10 3 secs  Exercise 5: hip abd with band x 10 3 secs  Exercise 6: ham curl with isometric x 10 5\"  Exercise 7: SLR  x 5  3 secs  : add quad set with SLR x 5  x3  sec hold  Exercise 8: step ups x 10 - 8\" step  Exercise 20: HEP: Cont current    Manual:   Manual Therapy  Soft Tissue Mobilizaton: Anterior distal quad/ post knee x 6 minutes    Modalities:  Cryotherapy (CPT 01734)  Patient Position: Supine  Number Minutes Cryotherapy: 10 mins  Cryotherapy location: Right,Knee  Electric stimulation, unattended (CPT [id number]) /  (Medicare)  Patient Position: Supine  E-stim location: Right,Knee  E-stim type: Interferential (IFC)       *Indicates exercise, modality, or manual techniques to be initiated when appropriate    Objective Measures:     Strength: [x] NT  [] MMT completed:     ROM: [] NT  [x] ROM measurements:     AROM LLE (degrees)  LLE General AROM: Knee 0-145, Hip flex 85   AROM RLE (degrees)  RLE General AROM: Knee -2-130          PROM RLE (degrees)  RLE General PROM: Flex 135 limited by discomfort      Assessment: Body Structures, Functions, Activity Limitations Requiring Skilled Therapeutic Intervention: Decreased functional mobility ,Increased pain,Decreased posture,Decreased ROM,Decreased strength,Decreased balance  Assessment: Patient displayed good tolerance when performing R knee ROM and strengthening this date. Patient c/o of slight tenderness and increase in pain in anterior R knee when performing quad sets with SLR. Progressed program to include step ups; forward and lateral to continue to improve LE and R knee strength. No increased pain noted throughout step ups. Concluded with IFC stim and quad 7 with medium compression to manage inflammation and pain. Patient noted decreased pain at end of tx. Treatment Diagnosis: R knee pain with functional limitations  Therapy Prognosis: Good          Post-Pain Assessment:       Pain Rating (0-10 pain scale):  3-4 /10   Location and pain description same as pre-treatment unless indicated. Action: [x] NA   [] Perform HEP  [] Meds as prescribed  [] Modalities as prescribed   [] Call Physician     GOALS   Patient Goal(s): Patient goals : Decrease Pain    Short Term Goals Completed by 2-3 weeks Goal Status   STG 1 Decrease R knee  pain 50% to assist with improved functional gains.  In progress   STG 2 Pt to perform SLS B LE 10 sec without UE assist to improve overall dynamic balance during gait. In progress     Long Term Goals Completed by 4-6 weeks Goal Status   LTG 1 Indep/compliance HEP for symptom management In progress   LTG 2 Pt demo improved overall function by reporting greater than 75% per functional survey score In progress   LTG 3 Pain-free R knee AROM to 0-130° allowing an increase in ADL tolerance. In progress   LTG 4 Improve R LE strength 4/5 to allow patient to recip stair climb In progress   LTG 5 Ambulate with safe/Indep community distances with min to no deviations with equal wt shift. In progress     Plan:  Frequency/Duration:  Plan  Plan Frequency: 2-3 times a week  Plan weeks: 4-6 weeks  Current Treatment Recommendations: Strengthening,ROM,Home exercise program,Aquatics,Stair training,Modalities,Gait training,Manual Therapy - Soft Tissue Mobilization,Balance training  Pt to continue current HEP. See objective section for any therapeutic exercise changes, additions or modifications this date.     Therapy Time:      PT Individual Minutes  Time In: 8448  Time Out: 2542  Minutes: 55  Timed Code Treatment Minutes: 45 Minutes  Procedure Minutes: 10 min IFC and CP (quad 7)  Timed Activity Minutes Units   Ther Ex 39 3   Manual  6      Electronically signed by Melida Livingston PTA on 5/3/22 at 3:04 PM EDT

## 2022-05-06 ENCOUNTER — HOSPITAL ENCOUNTER (OUTPATIENT)
Dept: PHYSICAL THERAPY | Age: 57
Setting detail: THERAPIES SERIES
Discharge: HOME | End: 2022-05-06
Payer: MEDICAID

## 2022-05-06 PROCEDURE — 97016 VASOPNEUMATIC DEVICE THERAPY: CPT

## 2022-05-06 PROCEDURE — 97110 THERAPEUTIC EXERCISES: CPT

## 2022-05-06 ASSESSMENT — PAIN DESCRIPTION - ORIENTATION: ORIENTATION: RIGHT

## 2022-05-06 ASSESSMENT — PAIN SCALES - GENERAL: PAINLEVEL_OUTOF10: 6

## 2022-05-06 ASSESSMENT — PAIN DESCRIPTION - LOCATION: LOCATION: KNEE

## 2022-05-06 NOTE — PROGRESS NOTES
[address] [place] [address]  [address]:[phone]  Treatment Note        Date: 2022  Patient: Sherre Kayser  : 1965   Confirmed: Yes  MRN: [id number]  Referring Provider: Eliu Valdivia MD   Secondary Referring Provider (If applicable):     Medical Diagnosis: Pain in right knee [M25.561]    Treatment Diagnosis: R knee pain with functional limitations    Visit Information:  Insurance: Payor: Altagracia Cane / Plan: Altagracia Cane / Product Type: *No Product type* /   PT Visit Information  Onset Date: 22  Total # of Visits Approved:  (EVAL ONLY,,,do not schedule/tx,ca @ 100% allowed amount DO NOT exceed 30 visits MAX* with auth only)  Total # of Visits to Date: 4  Plan of Care/Certification Expiration Date: 07/10/22  No Show: 0  Canceled Appointment: 0  Progress Note Counter: 3/18- visits (  units 22-7/10/22)*Corrected count    Subjective Information:  Subjective: Patient describes pain this date as a 6/10 and achy in R knee. \"Must be the weather. \"  HEP Compliance:  [x] Good [] Fair [] Poor [] Reports not doing due to:    Pain Screening  Patient Currently in Pain: Yes  Pain Assessment: 0-10  Pain Level: 6  Pain Location: Knee  Pain Orientation: Right    Treatment:  Exercises:  Exercises  Exercise 2: QS  x10 each 3 sec  , heel slide  x 10 5 secs,  Exercise 3: S/L hip abd x 10 3-5\" , Hip circles 2 x 10 ea  Exercise 4: hip add with ball x10 3 secs  Exercise 5: hip abd with band x 10 3 secs  Exercise 6: ham curl with isometric x 10 5\"  Exercise 7: SLR w. QS x 10  3 secs  Exercise 8: step ups x 10 - 8\" step  Exercise 9: Hip Bridges x 10- 3-5\" (GTB around knees)  Exercise 10: SLS  on R on BBD 4 x :10  Exercise 11: Vectors stabilizing on R 5 x 3 points (retro/lateral/forward)  Exercise 20: HEP: Cont current       Manual:   Manual Therapy  Soft Tissue Mobilizaton: Anterior distal quad/ post knee x 6 minutes Modalities:  Cryotherapy (CPT 56904)  Patient Position: Supine  Number Minutes Cryotherapy: 10 mins  Cryotherapy location: Right,Knee  Electric stimulation, unattended (CPT 22 941964) /  (Medicare)  Patient Position: Supine  E-stim location: Right,Knee  E-stim type: Interferential (IFC)  Vasopneumatic Device (CPT U4147312)  Patient Position: Supine  Vasopneumatic Specified Location: R knee  Pre-Girth Measurement: 42.0cm  Post-Girth Measurement: 42.0cm       *Indicates exercise, modality, or manual techniques to be initiated when appropriate    Objective Measures:     Strength: [x] NT  [] MMT completed:     ROM: [] NT  [x] ROM measurements:      AROM RLE (degrees)  RLE General AROM: Knee 0-135      Assessment: Body Structures, Functions, Activity Limitations Requiring Skilled Therapeutic Intervention: Decreased functional mobility ,Increased pain,Decreased posture,Decreased ROM,Decreased strength,Decreased balance  Assessment: Patient continued to demo decreased pain and good tolerance with R knee and LE strengthening this date. Patient displayed increased AROM with no pain at end range as well as when performing SLR with QS. Added vectors and SLS to program to facilitate increased R knee stability and strength. Concluded with quad 7 with compression as well as IFC stim for inflammation and post therapy pain control. Treatment Diagnosis: R knee pain with functional limitations  Therapy Prognosis: Good          Post-Pain Assessment:       Pain Rating (0-10 pain scale):  3-4 /10   Location and pain description same as pre-treatment unless indicated. Action: [x] NA   [] Perform HEP  [] Meds as prescribed  [] Modalities as prescribed   [] Call Physician     GOALS   Patient Goal(s): Patient goals : Decrease Pain    Short Term Goals Completed by 2-3 weeks Goal Status   STG 1 Decrease R knee  pain 50% to assist with improved functional gains.  In progress   STG 2 Pt to perform SLS B LE 10 sec without UE assist to improve overall dynamic balance during gait. In progress       Long Term Goals Completed by 4-6 weeks Goal Status   LTG 1 Indep/compliance HEP for symptom management In progress   LTG 2 Pt demo improved overall function by reporting greater than 75% per functional survey score In progress   LTG 3 Pain-free R knee AROM to 0-130° allowing an increase in ADL tolerance. In progress   LTG 4 Improve R LE strength 4/5 to allow patient to recip stair climb In progress   LTG 5 Ambulate with safe/Indep community distances with min to no deviations with equal wt shift. In progress     Plan:  Frequency/Duration:  Plan  Plan Frequency: 2-3 times a week  Plan weeks: 4-6 weeks  Current Treatment Recommendations: Strengthening,ROM,Home exercise program,Aquatics,Stair training,Modalities,Gait training,Manual Therapy - Soft Tissue Mobilization,Balance training  Pt to continue current HEP. See objective section for any therapeutic exercise changes, additions or modifications this date.     Therapy Time:      PT Individual Minutes  Time In: 1300  Time Out: 6282  Minutes: 57  Timed Code Treatment Minutes: 44 Minutes  Procedure Minutes: 10 min IFC and Cold Compression with quad 7  Timed Activity Minutes Units   Ther Ex 38 3   Manual  6      Electronically signed by Jt Higginbotham PTA on 5/6/22 at 2:03 PM EDT

## 2022-05-13 ENCOUNTER — HOSPITAL ENCOUNTER (OUTPATIENT)
Dept: PHYSICAL THERAPY | Age: 57
Setting detail: THERAPIES SERIES
Discharge: HOME | End: 2022-05-13
Payer: MEDICAID

## 2022-05-13 PROCEDURE — G0283 ELEC STIM OTHER THAN WOUND: HCPCS

## 2022-05-13 PROCEDURE — 97110 THERAPEUTIC EXERCISES: CPT

## 2022-05-13 ASSESSMENT — PAIN SCALES - GENERAL: PAINLEVEL_OUTOF10: 5

## 2022-05-13 ASSESSMENT — PAIN DESCRIPTION - ORIENTATION: ORIENTATION: RIGHT

## 2022-05-13 ASSESSMENT — PAIN DESCRIPTION - DESCRIPTORS: DESCRIPTORS: ACHING

## 2022-05-13 ASSESSMENT — PAIN DESCRIPTION - FREQUENCY: FREQUENCY: INTERMITTENT

## 2022-05-13 ASSESSMENT — PAIN DESCRIPTION - LOCATION: LOCATION: KNEE

## 2022-05-13 NOTE — PROGRESS NOTES
[address] [place] [address]  [address]  [facility]:[phone]  Treatment Note        Date: 2022  Patient: Edouard Aguirre  : 1965   Confirmed: Yes  MRN: [id number]  Referring Provider: Montserrat Morton MD   Medical Diagnosis: Pain in right knee [M25.561]    Treatment Diagnosis: R knee pain with functional limitations    Visit Information:  Insurance: Payor: [employer] / Plan: userADgents 58 / Product Type: *No Product type* /   PT Visit Information  Onset Date: 22  Total # of Visits Approved:  (100% allowed amount DO NOT exceed 30 visits MAX* with auth only)  Total # of Visits to Date: 5  Plan of Care/Certification Expiration Date: 07/10/22  No Show: 0  Canceled Appointment: 0  Progress Note Counter: - visits (15/72 units 22-7/10/22)    Subjective Information:  Subjective: Pt reports zero pain sitting and resting and increases to an aching 5/10 walking.   HEP Compliance:  [x] Good [] Fair [] Poor [] Reports not doing due to:    Pain Screening  Patient Currently in Pain: Yes  Pain Assessment: 0-10  Pain Level: 5  Best Pain Level: 0  Worst Pain Level: 5  Pain Location: Knee  Pain Orientation: Right  Pain Descriptors: Aching  Pain Frequency: Intermittent  Aggravating factors: Walking,Kneeling    Treatment:  Exercises:  Exercises  Exercise 2: QS  x 10, 3 sec  ,  heel slide  x 10 5 secs,  Exercise 3: S/L hip abd x 10 3-5\" , Hip circles x 5 ea (increased kne pain)  Exercise 4: hip add with ball x 15 3 secs  Exercise 5: hip abd with BTB x 15 3 secs  Exercise 6: ham curl with GTB x 10  Exercise 7: SLR w. QS x 10  3 secs  Exercise 8: step ups x 10 - 8\" step  Exercise 9: Hip Bridges x 10- 3-5\" (GTB around knees)  Exercise 10: SLS  on R on BBD 4 x :10  Exercise 11: stand hip ext on BBD x 10 ea  ,  mini squats with tball on wall x 10  Exercise 20: HEP: Cont current    Modalities:  Cryotherapy (CPT 22743)  Patient Position: Supine  Number Minutes Cryotherapy: 10 min with E-stim  Cryotherapy location: Right,Knee  Electric stimulation, unattended (CPT [id number]) /  (Medicare)  Patient Position: Supine  E-stim location: Right,Knee  E-stim type: Interferential (IFC)  Untimed (minutes): 10  Vasopneumatic Device (CPT [id number])  Patient Position: Supine  Vasopneumatic Specified Location: R knee (med compression)  Pre-Girth Measurement: 42.0cm  Post-Girth Measurement: 42.0cm    *Indicates exercise, modality, or manual techniques to be initiated when appropriate    Strength: [x] NT  [] MMT completed:    ROM: [] NT  [x] ROM measurements:  AROM RLE (degrees)  RLE General AROM: Knee 0-135     Assessment: Body Structures, Functions, Activity Limitations Requiring Skilled Therapeutic Intervention: Decreased functional mobility ,Increased pain,Decreased posture,Decreased ROM,Decreased strength,Decreased balance  Assessment: Pt tolerated increased reps with hip add/abd and added tband resistance to ham curls with good tolerance. Decreased reps with SL hip circles d/t c/o R knee pain. Met SLS goal. Added stand hip ext on BBD and mini wall squats with tball today. Conlcuded with E-stim/cold compression to control pain and swelling. Treatment Diagnosis: R knee pain with functional limitations  Therapy Prognosis: Good    Post-Pain Assessment:       Pain Rating (0-10 pain scale):   0/10   Location and pain description same as pre-treatment unless indicated. Action: [x] NA   [] Perform HEP  [] Meds as prescribed  [] Modalities as prescribed   [] Call Physician     GOALS   Patient Goal(s): Patient goals : Decrease Pain    Short Term Goals Completed by 2-3 weeks Goal Status   STG 1 Decrease R knee  pain 50% to assist with improved functional gains. In progress   STG 2 Pt to perform SLS B LE 10 sec without UE assist to improve overall dynamic balance during gait.  Met       Long Term Goals Completed by 4-6 weeks Goal Status   LTG 1 Indep/compliance HEP for symptom management In progress   LTG 2 Pt demo improved overall function by reporting greater than 75% per functional survey score In progress   LTG 3 Pain-free R knee AROM to 0-130° allowing an increase in ADL tolerance. Met   LTG 4 Improve R LE strength 4/5 to allow patient to recip stair climb In progress   LTG 5 Ambulate with safe/Indep community distances with min to no deviations with equal wt shift. In progress     Plan:  Frequency/Duration:  Plan  Plan Frequency: 2-3 times a week  Plan weeks: 4-6 weeks  Current Treatment Recommendations: Strengthening,ROM,Home exercise program,Aquatics,Stair training,Modalities,Gait training,Manual Therapy - Soft Tissue Mobilization,Balance training  Pt to continue current HEP. See objective section for any therapeutic exercise changes, additions or modifications this date.     Therapy Time:   PT Individual Minutes  Time In: 3659  Time Out: 4764  Minutes: 54  Timed Code Treatment Minutes: 41 Minutes  Procedure Minutes: E-stim/cold med compression quad 7 x 10 min  Timed Activity Minutes Units   Ther Ex 41 3     Electronically signed by Kenan Nieto PTA on 5/13/22 at 11:47 AM EDT

## 2022-05-20 ENCOUNTER — HOSPITAL ENCOUNTER (OUTPATIENT)
Dept: PHYSICAL THERAPY | Age: 57
Setting detail: THERAPIES SERIES
Discharge: HOME | End: 2022-05-20
Payer: MEDICAID

## 2022-05-20 ENCOUNTER — HOSPITAL ENCOUNTER (OUTPATIENT)
Dept: PHYSICAL THERAPY | Age: 57
Setting detail: THERAPIES SERIES
End: 2022-05-20
Payer: MEDICAID

## 2022-05-20 PROCEDURE — G0283 ELEC STIM OTHER THAN WOUND: HCPCS

## 2022-05-20 PROCEDURE — 97110 THERAPEUTIC EXERCISES: CPT

## 2022-05-20 ASSESSMENT — PAIN SCALES - GENERAL: PAINLEVEL_OUTOF10: 4

## 2022-05-20 ASSESSMENT — PAIN DESCRIPTION - ORIENTATION: ORIENTATION: RIGHT

## 2022-05-20 ASSESSMENT — PAIN DESCRIPTION - FREQUENCY: FREQUENCY: INTERMITTENT

## 2022-05-20 ASSESSMENT — PAIN DESCRIPTION - LOCATION: LOCATION: KNEE

## 2022-05-20 ASSESSMENT — PAIN DESCRIPTION - DESCRIPTORS: DESCRIPTORS: ACHING

## 2022-05-20 NOTE — PROGRESS NOTES
[address] [facility] [address]  [facility]  KETMB:[phone]  Treatment Note        Date: 2022  Patient: Queenie Ramirez  : 1965   Confirmed: Yes  MRN: [id number]  Referring Provider: Saumya Weber MD   Medical Diagnosis: Pain in right knee [M25.561]    Treatment Diagnosis: R knee pain with functional limitations    Visit Information:  Insurance: Payor: [employer] / Plan: [employer] unlimited 58 / Product Type: *No Product type* /   PT Visit Information  Onset Date: 22  Total # of Visits Approved:  (100% allowed amount DO NOT exceed 30 visits MAX* with auth only)  Total # of Visits to Date: 6  Plan of Care/Certification Expiration Date: 07/10/22  No Show: 0  Canceled Appointment: 0  Progress Note Counter: - visits ( units 22-7/10/22)    Subjective Information:  Subjective: Pt states that her knee isn't doing too bad today. 4/10 pain and it's mostly when she's walking. Every once in a while it will wake her at night.   HEP Compliance:  [x] Good [] Fair [] Poor [] Reports not doing due to:    Pain Screening  Patient Currently in Pain: Yes  Pain Assessment: 0-10  Pain Level: 4  Pain Location: Knee  Pain Orientation: Right  Pain Descriptors: Aching  Pain Frequency: Intermittent    Treatment:  Exercises:  Exercises  Exercise 2: QS x 15, 3\"  ,  heel slide x 15, 5\"  Exercise 3: S/L hip abd x 10, 3\"  Exercise 4: hip add with ball x 15, 3\"  Exercise 5: hip abd with BTB x 15, 3\"  ,  Bridge with BTB around knee x 10, 3\"  Exercise 6: R ham curl with GTB x 15, 3\"  Exercise 7: SLR w. QS x 10, 3\"  Exercise 8: step ups x 15 - 8\" step  Exercise 9: stand hip ext/abd on BBD x 10 ea  ,  mini squats with tball on wall x 10  Exercise 10: stand HS curl with 5# x 15 (hurts to lie prone)  Exercise 20: HEP: Cont current    Modalities:  Cryotherapy (CPT 03858)  Patient Position: Supine  Number Minutes Cryotherapy: 10 min with E-stim  Cryotherapy location: Right,Knee  Electric stimulation, unattended (CPT 22 [id number]) /  (Medicare)  Patient Position: Supine  E-stim location: Right,Knee  E-stim type: Interferential (IFC)  Untimed (minutes): 10  Vasopneumatic Device (CPT [id number])  Patient Position: Supine  Vasopneumatic Specified Location: R knee (med compression)  Pre-Girth Measurement: 41.5cm  Post-Girth Measurement: 41.5cm    *Indicates exercise, modality, or manual techniques to be initiated when appropriate    Strength: [] NT  [x] MMT completed:  Strength RLE  Comment: Hip 4/5, knee quad 4/5, 3+ to 4-/5 ham, ankle 4/5  Strength LLE  Comment: Hip 4/5, knee 4+/5, ankle 4/5    ROM: [x] NT  [] ROM measurements:    Assessment: Body Structures, Functions, Activity Limitations Requiring Skilled Therapeutic Intervention: Decreased functional mobility ,Increased pain,Decreased posture,Decreased ROM,Decreased strength,Decreased balance  Assessment: Pt responded well to increased reps throughout strengthening ex's. Showing overall improved R LE strength with MMT. Added standing ham curls with 5# wt with that being the main area of weakness from MMT. Treatment Diagnosis: R knee pain with functional limitations  Therapy Prognosis: Good    Post-Pain Assessment:       Pain Rating (0-10 pain scale):   0/10   Location and pain description same as pre-treatment unless indicated. Action: [x] NA   [] Perform HEP  [] Meds as prescribed  [] Modalities as prescribed   [] Call Physician     GOALS   Patient Goal(s): Patient goals : Decrease Pain    Short Term Goals Completed by 2-3 weeks Goal Status   STG 1 Decrease R knee  pain 50% to assist with improved functional gains. In progress   STG 2 Pt to perform SLS B LE 10 sec without UE assist to improve overall dynamic balance during gait.  Met       Long Term Goals Completed by 4-6 weeks Goal Status   LTG 1 Indep/compliance HEP for symptom management In progress   LTG 2 Pt demo improved overall function by reporting greater than 75% per functional survey score In progress   LTG 3 Pain-free R knee AROM to 0-130° allowing an increase in ADL tolerance. Met   LTG 4 Improve R LE strength 4/5 to allow patient to recip stair climb In progress   LTG 5 Ambulate with safe/Indep community distances with min to no deviations with equal wt shift. In progress     Plan:  Frequency/Duration:  Plan  Plan Frequency: 2-3 times a week  Plan weeks: 4-6 weeks  Current Treatment Recommendations: Strengthening,ROM,Home exercise program,Aquatics,Stair training,Modalities,Gait training,Manual Therapy - Soft Tissue Mobilization,Balance training  Pt to continue current HEP. See objective section for any therapeutic exercise changes, additions or modifications this date.     Therapy Time:   PT Individual Minutes  Time In: 1055  Time Out: 4745  Minutes: 54  Timed Code Treatment Minutes: 42 Minutes  Procedure Minutes: E-stim/cold med compression quad 7 x 10 min  Timed Activity Minutes Units   Ther Ex 42 3     Electronically signed by Estefani Ku PTA on 5/20/22 at 11:53 AM EDT

## 2022-05-24 ENCOUNTER — HOSPITAL ENCOUNTER (OUTPATIENT)
Dept: PHYSICAL THERAPY | Age: 57
Setting detail: THERAPIES SERIES
Discharge: HOME | End: 2022-05-24
Payer: MEDICAID

## 2022-05-24 PROCEDURE — 97140 MANUAL THERAPY 1/> REGIONS: CPT

## 2022-05-24 PROCEDURE — G0283 ELEC STIM OTHER THAN WOUND: HCPCS

## 2022-05-24 PROCEDURE — 97110 THERAPEUTIC EXERCISES: CPT

## 2022-05-24 ASSESSMENT — PAIN DESCRIPTION - LOCATION: LOCATION: KNEE

## 2022-05-24 ASSESSMENT — PAIN DESCRIPTION - DESCRIPTORS: DESCRIPTORS: TIGHTNESS

## 2022-05-24 ASSESSMENT — PAIN SCALES - GENERAL: PAINLEVEL_OUTOF10: 4

## 2022-05-24 ASSESSMENT — PAIN DESCRIPTION - ORIENTATION: ORIENTATION: RIGHT

## 2022-05-24 NOTE — PROGRESS NOTES
[address] [place] [address],[address]  [address]  VOYYF:[phone]  Treatment Note        Date: 2022  Patient: Boby Nation  : 1965   Confirmed: Yes  MRN: [id number]  Referring Provider: Maximino Nayak MD   Secondary Referring Provider (If applicable):     Medical Diagnosis: Pain in right knee [M25.561]    Treatment Diagnosis: R knee pain with functional limitations    Visit Information:  Insurance: Payor: Carlo Michela / Plan: Carlo Michela / Product Type: *No Product type* /   PT Visit Information  Onset Date: 22  Total # of Visits Approved:  (100% allowed amount DO NOT exceed 30 visits MAX* with auth only)  Total # of Visits to Date: 7  Plan of Care/Certification Expiration Date: 07/10/22  No Show: 0  Canceled Appointment: 0  Progress Note Counter: - visits ( units 22-7/10/22)    Subjective Information:  Subjective: Patient reports pain on the anterior lateral portion of R knee when walking. Rates pain a 4/10.   HEP Compliance:  [x] Good [] Fair [] Poor [] Reports not doing due to:    Pain Screening  Patient Currently in Pain: Yes  Pain Assessment: 0-10  Pain Level: 4  Pain Location: Knee  Pain Orientation: Right  Pain Descriptors: Tightness    Treatment:  Exercises:  Exercises  Exercise 2: QS x 20, 3\"  ,  heel slide x 15, 5\"  Exercise 3: S/L hip abd x 10, 3\"  Exercise 4: hip add with ball x 15, 3\"  Exercise 5: hip abd with BTB x 20, 3\"  ,  Bridge with BTB around knee x 10, 3\"  Exercise 6: R ham curl with GTB x 15, 3\"  Exercise 7: SLR w. QS x 10, 3\"  Exercise 8: step ups x 20 - 8\" step  Exercise 9: stand hip ext/abd on BBD x 10 ea  ,  mini squats with tball on wall  2 x 10  Exercise 10: stand HS curl with 5# x 15 (hurts to lie prone)  Exercise 11: Clamshells x 12 BTB  Exercise 20: HEP: Cont current       Manual:   Manual Therapy  Soft Tissue Mobilizaton: Anterior distal quad/ post knee x 8 minutes Modalities:  Cryotherapy (CPT 39994)  Patient Position: Supine  Number Minutes Cryotherapy: 10 min with E-stim  Cryotherapy location: Right  Electric stimulation, unattended (CPT 66369) /  (Medicare)  Patient Position: Supine  E-stim location: Right,Knee  E-stim type: Interferential (IFC)  Untimed (minutes): 10       *Indicates exercise, modality, or manual techniques to be initiated when appropriate    Objective Measures:      Strength: [x] NT  [] MMT completed:     ROM: [x] NT  [] ROM measurements:     Assessment: Body Structures, Functions, Activity Limitations Requiring Skilled Therapeutic Intervention: Decreased functional mobility ,Increased pain,Decreased posture,Decreased ROM,Decreased strength,Decreased balance  Assessment: Continued to display good tolerance with dynamic standing and supine strengthening exercises. Tightness noted in distal R quad musculature this date; STM performed to area to reduce tightness and discomfort when flexing R knee. Treatment Diagnosis: R knee pain with functional limitations  Therapy Prognosis: Good          Post-Pain Assessment:       Pain Rating (0-10 pain scale):  1 /10   Location and pain description same as pre-treatment unless indicated. Action: [x] NA   [] Perform HEP  [] Meds as prescribed  [] Modalities as prescribed   [] Call Physician     GOALS   Patient Goal(s): Patient goals : Decrease Pain    Short Term Goals Completed by 2-3 weeks Goal Status   STG 1 Decrease R knee  pain 50% to assist with improved functional gains. In progress   STG 2 Pt to perform SLS B LE 10 sec without UE assist to improve overall dynamic balance during gait. Met       Long Term Goals Completed by 4-6 weeks Goal Status   LTG 1 Indep/compliance HEP for symptom management In progress   LTG 2 Pt demo improved overall function by reporting greater than 75% per functional survey score In progress   LTG 3 Pain-free R knee AROM to 0-130° allowing an increase in ADL tolerance.  Met LTG 4 Improve R LE strength 4/5 to allow patient to recip stair climb In progress   LTG 5 Ambulate with safe/Indep community distances with min to no deviations with equal wt shift. In progress     Plan:  Frequency/Duration:  Plan  Plan Frequency: 2-3 times a week  Plan weeks: 4-6 weeks  Current Treatment Recommendations: Strengthening,ROM,Home exercise program,Aquatics,Stair training,Modalities,Gait training,Manual Therapy - Soft Tissue Mobilization,Balance training  Pt to continue current HEP. See objective section for any therapeutic exercise changes, additions or modifications this date.     Therapy Time:      PT Individual Minutes  Time In: 1430  Time Out: 1523  Minutes: 53  Timed Code Treatment Minutes: 43 Minutes  Procedure Minutes: 10 min estim and CP  Timed Activity Minutes Units   Ther Ex 35 2   Manual  8 1     Electronically signed by Elza Huddleston PTA on 5/24/22 at 5:04 PM EDT

## 2022-05-31 ENCOUNTER — HOSPITAL ENCOUNTER (OUTPATIENT)
Dept: MRI IMAGING | Age: 57
Discharge: HOME | End: 2022-06-02
Payer: MEDICAID

## 2022-05-31 DIAGNOSIS — S89.91XA INJURY OF RIGHT KNEE, INITIAL ENCOUNTER: ICD-10-CM

## 2022-05-31 DIAGNOSIS — M25.561 RIGHT KNEE PAIN, UNSPECIFIED CHRONICITY: ICD-10-CM

## 2022-05-31 PROCEDURE — 73721 MRI JNT OF LWR EXTRE W/O DYE: CPT

## 2022-06-03 ENCOUNTER — HOSPITAL ENCOUNTER (OUTPATIENT)
Dept: PHYSICAL THERAPY | Age: 57
Setting detail: THERAPIES SERIES
Discharge: HOME | End: 2022-06-03
Payer: MEDICAID

## 2022-06-03 PROCEDURE — G0283 ELEC STIM OTHER THAN WOUND: HCPCS

## 2022-06-03 PROCEDURE — 97110 THERAPEUTIC EXERCISES: CPT

## 2022-06-03 ASSESSMENT — PAIN SCALES - GENERAL: PAINLEVEL_OUTOF10: 6

## 2022-06-03 ASSESSMENT — PAIN DESCRIPTION - FREQUENCY: FREQUENCY: INTERMITTENT

## 2022-06-03 ASSESSMENT — PAIN DESCRIPTION - ORIENTATION: ORIENTATION: RIGHT

## 2022-06-03 ASSESSMENT — PAIN DESCRIPTION - DESCRIPTORS: DESCRIPTORS: ACHING

## 2022-06-03 ASSESSMENT — PAIN DESCRIPTION - LOCATION: LOCATION: KNEE

## 2022-06-03 NOTE — PROGRESS NOTES
[address] [place] [address]  [address]  YKPOT:[phone]  Treatment Note        Date: 6/3/2022  Patient: Miguelina Overall  : 1965   Confirmed: Yes  MRN: [id number]  Referring Provider: Mery Pablo MD   Medical Diagnosis: Pain in right knee [M25.561]    Treatment Diagnosis: R knee pain with functional limitations    Visit Information:  Insurance: Payor: Mery Jo / Plan: Mery Jo / Product Type: *No Product type* /   PT Visit Information  Onset Date: 22  Total # of Visits Approved:  (100% allowed amount DO NOT exceed 30 visits MAX* with auth only)  Total # of Visits to Date: 8  Plan of Care/Certification Expiration Date: 07/10/22  No Show: 0  Canceled Appointment: 0  Progress Note Counter: - visits ( units 22-7/10/22)    Subjective Information:  Subjective: Pt had an MRI on her R knee Tuesday. She's been experiencing increased R knee pain while walking over the past 2 days and hasn't done anything different that would have irritated it besides having to keep it straight in the MRI machine for so long.   HEP Compliance:  [x] Good [] Fair [] Poor [] Reports not doing due to:    Pain Screening  Patient Currently in Pain: Yes  Pain Assessment: 0-10  Pain Level: 6 (walking)  Best Pain Level: 0  Pain Location: Knee  Pain Orientation: Right  Pain Descriptors: Aching  Pain Frequency: Intermittent    Treatment:  Exercises:  Exercises  Exercise 2: QS x 20, 3\"  ,  heel slide x 15, 5\"  Exercise 3: S/L hip abd x 10, 3\"  Exercise 4: hip add with ball x 15, 3\"  Exercise 5: hip abd with BTB x 20, 3\"  ,  Bridge with BTB around knee x 10, 3\"  Exercise 6: R ham curl with GTB x 15, 3\"  Exercise 7: R SLR w. QS x 10, 3\"  Exercise 8: step ups, 8\" x 20  (up with R, down with L)  Exercise 9: stand R hip ext/abd on BBD x 10 ea  ,  mini squats with tball on wall  x 10  Exercise 10: stand R HS curl with 5# x 15 (hurts to lie prone)  Exercise 11: Clamshells x 12 BTB  Exercise 20: HEP: Cont current      Modalities:  Cryotherapy (CPT 85768)  Patient Position: Supine  Number Minutes Cryotherapy: 10 min with E-stim  Cryotherapy location: Right  Electric stimulation, unattended (CPT 60732) /  (Medicare)  Patient Position: Supine  E-stim location: Right,Knee  E-stim type: Interferential (IFC)  Untimed (minutes): 10      *Indicates exercise, modality, or manual techniques to be initiated when appropriate    Strength: [x] NT  [] MMT completed:      ROM: [x] NT  [] ROM measurements:      Assessment: Body Structures, Functions, Activity Limitations Requiring Skilled Therapeutic Intervention: Decreased functional mobility ,Increased pain,Decreased posture,Decreased ROM,Decreased strength,Decreased balance  Assessment: Pt able to perform all knee stab therex despite higher pain levels entering clinic. Pt states that therapy seems to help a little. Reported relief post session. Treatment Diagnosis: R knee pain with functional limitations  Therapy Prognosis: Good      Post-Pain Assessment:       Pain Rating (0-10 pain scale):   0/10   Location and pain description same as pre-treatment unless indicated. Action: [x] NA   [] Perform HEP  [] Meds as prescribed  [] Modalities as prescribed   [] Call Physician     GOALS   Patient Goal(s): Patient goals : Decrease Pain    Short Term Goals Completed by 2-3 weeks Goal Status   STG 1 Decrease R knee  pain 50% to assist with improved functional gains. In progress   STG 2 Pt to perform SLS B LE 10 sec without UE assist to improve overall dynamic balance during gait. Met         Long Term Goals Completed by 4-6 weeks Goal Status   LTG 1 Indep/compliance HEP for symptom management In progress   LTG 2 Pt demo improved overall function by reporting greater than 75% per functional survey score In progress   LTG 3 Pain-free R knee AROM to 0-130° allowing an increase in ADL tolerance.  Met   LTG 4 Improve R LE strength 4/5 to allow patient to recip stair climb In progress   LTG 5 Ambulate with safe/Indep community distances with min to no deviations with equal wt shift. In progress     Plan:  Frequency/Duration:  Plan  Plan Frequency: 2-3 times a week  Plan weeks: 4-6 weeks  Current Treatment Recommendations: Strengthening,ROM,Home exercise program,Aquatics,Stair training,Modalities,Gait training,Manual Therapy - Soft Tissue Mobilization,Balance training  Pt to continue current HEP. See objective section for any therapeutic exercise changes, additions or modifications this date.     Therapy Time:   PT Individual Minutes  Time In: 9018  Time Out: 1435  Minutes: 51  Timed Code Treatment Minutes: 40 Minutes  Procedure Minutes: E-stim/CP x 10'   Timed Activity Minutes Units   Ther Ex 40 3     Electronically signed by Gurinder Gaona PTA on 6/3/22 at 2:24 PM EDT

## 2022-06-07 ENCOUNTER — HOSPITAL ENCOUNTER (OUTPATIENT)
Dept: PHYSICAL THERAPY | Age: 57
Setting detail: THERAPIES SERIES
Discharge: HOME | End: 2022-06-07
Payer: MEDICAID

## 2022-06-07 PROCEDURE — G0283 ELEC STIM OTHER THAN WOUND: HCPCS

## 2022-06-07 PROCEDURE — 97110 THERAPEUTIC EXERCISES: CPT

## 2022-06-07 ASSESSMENT — PAIN SCALES - GENERAL: PAINLEVEL_OUTOF10: 5

## 2022-06-07 ASSESSMENT — PAIN DESCRIPTION - LOCATION: LOCATION: KNEE

## 2022-06-07 ASSESSMENT — PAIN DESCRIPTION - FREQUENCY: FREQUENCY: INTERMITTENT

## 2022-06-07 ASSESSMENT — PAIN DESCRIPTION - ORIENTATION: ORIENTATION: RIGHT

## 2022-06-07 ASSESSMENT — PAIN DESCRIPTION - DESCRIPTORS: DESCRIPTORS: ACHING

## 2022-06-07 NOTE — PROGRESS NOTES
[address] [place] [address],[address]  [address]  KJXS:[phone]  Treatment Note        Date: 2022  Patient: Alexandra Weston  : 1965   Confirmed: Yes  MRN: [id number]  Referring Provider: Guillermo Mejias MD   Medical Diagnosis: Pain in right knee [M25.561]    Treatment Diagnosis: R knee pain with functional limitations    Visit Information:  Insurance: Payor: "[employer] PL / Plan: [facility] / Product Type: *No Product type* /   PT Visit Information  Onset Date: 22  Total # of Visits Approved:  (100% allowed amount DO NOT exceed 30 visits MAX* with auth only)  Total # of Visits to Date: 5  Plan of Care/Certification Expiration Date: 07/10/22  No Show: 0  Canceled Appointment: 0  Progress Note Counter: - visits ( units 22-7/10/22)    Subjective Information:  Subjective: Pt made an appointment with Dr. Sariah Dewey to review her R knee MRI results on 22.  Reports 5/10 pain with walking and better with rest.  HEP Compliance:  [x] Good [] Fair [] Poor [] Reports not doing due to:    Pain Screening  Patient Currently in Pain: Yes  Pain Assessment: 0-10  Pain Level: 5 (with walking)  Pain Location: Knee  Pain Orientation: Right  Pain Descriptors: Aching  Pain Frequency: Intermittent    Treatment:  Exercises:  Exercises  Exercise 2: QS x 20, 3\"  ,  heel slide x 15, 5\"  Exercise 3: S/L hip abd x 10, 3\"  Exercise 4: hip add with ball x 20, 3\"  Exercise 5: hip abd with BTB x 20, 3\"  ,  Bridge with BTB around knee x 10, 3\" , Clamshells with BTB x 15  Exercise 6: R ham curl with GTB x 15, 3\"  Exercise 7: R SLR w. QS x 10, 3\"  Exercise 8: step ups, 8\" x 20  (up with R, down with L)  Exercise 9: stand R hip abd x 15 / ext x 10 on BBD  ,  mini squats with tball on wall  x 10  Exercise 10: stand R HS curl with 5# x 10 (hurts to lie prone)  Exercise 20: HEP: Cont current    Modalities:  Cryotherapy (CPT 09167)  Patient Position: Supine  Number Minutes Cryotherapy: 10 min with E-stim  Cryotherapy location: Right  Electric stimulation, unattended (CPT 96667) /  (Medicare)  Patient Position: Supine  E-stim location: Right,Knee  E-stim type: Interferential (IFC)  Untimed (minutes): 10    *Indicates exercise, modality, or manual techniques to be initiated when appropriate    Strength: [x] NT  [] MMT completed:    ROM: [x] NT  [] ROM measurements:      Assessment: Body Structures, Functions, Activity Limitations Requiring Skilled Therapeutic Intervention: Decreased functional mobility ,Increased pain,Decreased posture,Decreased ROM,Decreased strength,Decreased balance  Assessment: Pt tolerated increased reps throughout R knee stab therex but decreased reps with stand hs curls with 5 # weight d/t irritation. No other complaints. Reported relief post E-stim/CP. Treatment Diagnosis: R knee pain with functional limitations  Therapy Prognosis: Good      Post-Pain Assessment:       Pain Rating (0-10 pain scale):   0/10   Location and pain description same as pre-treatment unless indicated. Action: [x] NA   [] Perform HEP  [] Meds as prescribed  [] Modalities as prescribed   [] Call Physician     GOALS   Patient Goal(s): Patient goals : Decrease Pain    Short Term Goals Completed by 2-3 weeks Goal Status   STG 1 Decrease R knee  pain 50% to assist with improved functional gains. In progress   STG 2 Pt to perform SLS B LE 10 sec without UE assist to improve overall dynamic balance during gait. Met       Long Term Goals Completed by 4-6 weeks Goal Status   LTG 1 Indep/compliance HEP for symptom management In progress   LTG 2 Pt demo improved overall function by reporting greater than 75% per functional survey score In progress   LTG 3 Pain-free R knee AROM to 0-130° allowing an increase in ADL tolerance.  Met   LTG 4 Improve R LE strength 4/5 to allow patient to recip stair climb In progress   LTG 5 Ambulate with safe/Indep community distances with min to no deviations with equal wt shift. In progress     Plan:  Frequency/Duration:  Plan  Plan Frequency: 2-3 times a week  Plan weeks: 4-6 weeks  Current Treatment Recommendations: Strengthening,ROM,Home exercise program,Aquatics,Stair training,Modalities,Gait training,Manual Therapy - Soft Tissue Mobilization,Balance training  Pt to continue current HEP. See objective section for any therapeutic exercise changes, additions or modifications this date.     Therapy Time:   PT Individual Minutes  Time In: 5546  Time Out: 6177  Minutes: 48  Timed Code Treatment Minutes: 38 Minutes  Procedure Minutes: E-stim/CP x 10'   Timed Activity Minutes Units   Ther Ex 38 3     Electronically signed by Lemmie Prader, PTA on 6/7/22 at 3:49 PM EDT

## 2022-06-10 ENCOUNTER — HOSPITAL ENCOUNTER (OUTPATIENT)
Dept: PHYSICAL THERAPY | Age: 57
Setting detail: THERAPIES SERIES
Discharge: HOME | End: 2022-06-10
Payer: MEDICAID

## 2022-06-10 PROCEDURE — 97140 MANUAL THERAPY 1/> REGIONS: CPT

## 2022-06-10 PROCEDURE — 97110 THERAPEUTIC EXERCISES: CPT

## 2022-06-10 PROCEDURE — G0283 ELEC STIM OTHER THAN WOUND: HCPCS

## 2022-06-10 ASSESSMENT — PAIN DESCRIPTION - LOCATION: LOCATION: KNEE

## 2022-06-10 ASSESSMENT — PAIN DESCRIPTION - ORIENTATION: ORIENTATION: RIGHT

## 2022-06-10 ASSESSMENT — PAIN SCALES - GENERAL: PAINLEVEL_OUTOF10: 4

## 2022-06-10 ASSESSMENT — PAIN DESCRIPTION - DESCRIPTORS: DESCRIPTORS: ACHING;TIGHTNESS

## 2022-06-10 NOTE — PROGRESS NOTES
[address] [facility] [address]  [facility]  DAMRN:[phone]  Treatment Note        Date: 6/10/2022  Patient: Rohini Ontiveros  : 1965   Confirmed: Yes  MRN: [id number]  Referring Provider: Froilan Rinaldi MD   Secondary Referring Provider (If applicable):     Medical Diagnosis: Pain in right knee [M25.561]    Treatment Diagnosis: R knee pain with functional limitations    Visit Information:  Insurance: Payor: [employer] / Plan: [employer] / Product Type: *No Product type* /   PT Visit Information  Onset Date: 22  Total # of Visits Approved:  (100% allowed amount DO NOT exceed 30 visits MAX* with auth only)  Total # of Visits to Date: 6  Plan of Care/Certification Expiration Date: 07/10/22  No Show: 0  Canceled Appointment: 0  Progress Note Counter: 10/18- visits ( units 22-7/10/22)    Subjective Information:  Subjective: Patient reports decreased pain after last therapy visit. Reports occ increased pain when taking a couple awkward steps recently.   HEP Compliance:  [x] Good [] Fair [] Poor [] Reports not doing due to:    Pain Screening  Patient Currently in Pain: Yes  Pain Assessment: 0-10  Pain Level: 4  Pain Location: Knee  Pain Orientation: Right  Pain Descriptors: Aching,Tightness    Treatment:  Exercises:  Exercises  Exercise 3: S/L hip abd x 15, 3\"  Exercise 4: hip add with ball x 20, 5\"  Exercise 5: hip abd with BTB x 20, 3\"  ,  Bridge with BTB around knee x 15, 3\" , Clamshells with BTB x 15  Exercise 7: R SLR w. QS x 15, 3\"  Exercise 8: step ups, 8\" x 20  (up with R, down with L)  Exercise 9: mini squats with tball on wall 2 x 10 - 3-5s  Exercise 10: stand R HS curl with 5# 2 x 10 (hurts to lie prone)  Exercise 20: HEP: Cont current       Manual:   Manual Therapy  Joint Mobilization: tibial distraction and post tibial glide x 2 minutes  Soft Tissue Mobilizaton: Anterior distal quad/ post knee x 6 minutes       Modalities:  Cryotherapy (CPT 84376)  Patient Position: Supine  Number Minutes Cryotherapy: 10 min with E-stim  Cryotherapy location: Right  Electric stimulation, unattended (CPT 12483) /  (Medicare)  Patient Position: Supine  E-stim location: Right,Knee  E-stim type: Interferential (IFC)  Untimed (minutes): 10       *Indicates exercise, modality, or manual techniques to be initiated when appropriate    Objective Measures:      Strength: [x] NT  [] MMT completed:     ROM: [x] NT  [] ROM measurements:     Assessment: Body Structures, Functions, Activity Limitations Requiring Skilled Therapeutic Intervention: Decreased functional mobility ,Increased pain,Decreased posture,Decreased ROM,Decreased strength,Decreased balance  Assessment: Patient tolerated increased repetitions of R knee strengthening exercises that included mini squats and hamstring cursl this date. No increased pain noted but did mention fatigue near end of sets. Initiated tibial-femoral distraction with  posterior tibial glide with good tolerance by patient. Reported relief post estim and cold pack this date. Treatment Diagnosis: R knee pain with functional limitations  Therapy Prognosis: Good          Post-Pain Assessment:       Pain Rating (0-10 pain scale): 0  /10   Location and pain description same as pre-treatment unless indicated. Action: [x] NA   [] Perform HEP  [] Meds as prescribed  [] Modalities as prescribed   [] Call Physician     GOALS   Patient Goal(s): Patient goals : Decrease Pain    Short Term Goals Completed by 2-3 weeks Goal Status   STG 1 Decrease R knee  pain 50% to assist with improved functional gains. In progress   STG 2 Pt to perform SLS B LE 10 sec without UE assist to improve overall dynamic balance during gait.  Met       Long Term Goals Completed by 4-6 weeks Goal Status   LTG 1 Indep/compliance HEP for symptom management In progress   LTG 2 Pt demo improved overall function by reporting greater than 75% per functional survey score In progress   LTG 3 Pain-free R knee AROM to 0-130° allowing an increase in ADL tolerance. Met   LTG 4 Improve R LE strength 4/5 to allow patient to recip stair climb In progress   LTG 5 Ambulate with safe/Indep community distances with min to no deviations with equal wt shift. In progress     Plan:  Frequency/Duration:  Plan  Plan Frequency: 2-3 times a week  Plan weeks: 4-6 weeks  Current Treatment Recommendations: Strengthening,ROM,Home exercise program,Aquatics,Stair training,Modalities,Gait training,Manual Therapy - Soft Tissue Mobilization,Balance training  Pt to continue current HEP. See objective section for any therapeutic exercise changes, additions or modifications this date.     Therapy Time:      PT Individual Minutes  Time In: 4536  Time Out: 8533  Minutes: 51  Timed Code Treatment Minutes: 39 Minutes  Procedure Minutes: 10 min estim and cp  Timed Activity Minutes Units   Ther Ex 31 2   Manual  8 1     Electronically signed by Jane Cox PTA on 6/10/22 at 12:05 PM EDT

## 2022-06-14 ENCOUNTER — HOSPITAL ENCOUNTER (OUTPATIENT)
Dept: PHYSICAL THERAPY | Age: 57
Setting detail: THERAPIES SERIES
Discharge: HOME | End: 2022-06-14
Payer: MEDICAID

## 2022-06-14 NOTE — PROGRESS NOTES
Therapy                            Cancellation/No-show Note    Date: 2022  Patient: Renan Escobar (33 y.o. female)  : 1965  MRN:  [id number]  Referring Physician: Keyla Luong MD     Medical Diagnosis: Pain in right knee [M25.561]      Visit Information:  Insurance: Payor: [facility] PL / Plan: [facility] / Product Type: *No Product type* /   Visits to Date:    No Show/Cancelled Appts: 0       For today's appointment patient:  [x]  Cancelled  []  Rescheduled appointment  []  No-show   [x]  Called pt to remind of next appointment     Reason given by patient:  []  Patient ill  []  Conflicting appointment  []  No transportation    []  Conflict with work  []  No reason given  [x]  Other: Unable to make it. Confirmed next appointment. [x] Pt has future appointments scheduled, no follow up needed  [] Pt requests to be on hold.     Reason:   If > 2 weeks please discuss with therapist.  [] Therapist to call pt for follow up     Comments:       Signature: Electronically signed by Isabella Tsang PTA on 22 at 2:53 PM EDT

## 2022-06-17 ENCOUNTER — HOSPITAL ENCOUNTER (OUTPATIENT)
Dept: PHYSICAL THERAPY | Age: 57
Setting detail: THERAPIES SERIES
Discharge: HOME | End: 2022-06-17
Payer: MEDICAID

## 2022-06-17 NOTE — PROGRESS NOTES
Therapy                            Cancellation/No-show Note    Date: 2022  Patient: Samson Roque (89 y.o. female)  : 1965  MRN:  [id number]  Referring Physician: Carmen Boone MD     Medical Diagnosis: Pain in right knee [M25.561]      Visit Information:  Insurance: Payor: [facility] PL / Plan: Malaika Brink / Product Type: *No Product type* /   Visits to Date: 11   No Show/Cancelled Appts: 0       For today's appointment patient:  [x]  Cancelled  []  Rescheduled appointment  []  No-show   []  Called pt to remind of next appointment     Reason given by patient:  []  Patient ill  [x]  Conflicting appointment  []  No transportation    []  Conflict with work  []  No reason given  []  Other:      [x] Pt has future appointments scheduled, no follow up needed  [] Pt requests to be on hold. Reason:   If > 2 weeks please discuss with therapist.  [] Therapist to call pt for follow up     Comments:   Patient is seeing a different doctor today and will call to reschedule.      Signature: Electronically signed by Cele Felipe PTA on 22 at 2:02 PM EDT

## 2022-06-28 NOTE — PROGRESS NOTES
[address]  [address]  ZNJUV:[phone]     []? Certification  []? Recertification     []? Plan of Care  []? Progress Note [x]? Discharge                            To:  Dr Sandra Colindres       From:  Martha Bustillo, PT  Patient: Gamaliel Smith     : 1965  Diagnosis: R knee pain     Date: 6/10/2022  Treatment Diagnosis: R knee pain with functional limitations  Progress Report Period from:  2022  to 6/10/22     Total # of Visits to Date: 11  No Show: 0    Canceled Appointment: 2     OBJECTIVE:   Short Term Goals - Time Frame for Short term goals: 2-3 weeks    Goals Current/Discharge status  Met   Short term goal 1: Decrease R knee  pain 50% to assist with improved functional gains. Pain Location: R Knee    Pain Level: -6/10     Pain Onset: With increased walking and climbing steps. Pain Descriptors: Aching [x]? yes  [x]? no   Short term goal 2: Pt to perform SLS B LE 10 sec without UE assist to improve overall dynamic balance during gait. Patient able to hold SLS for >/= 10 seconds on B LE. Patient able to perform mini squats and step ups with good demonstration and no increased pain. [x]? yes  []? no      Long Term Goals - Time Frame for Long term goals : 4-6 weeks  Goals Current/ Discharge status Met   Long term goal 1: Indep/compliance HEP for symptom management Patient has reported performing exercises at home and on a regular basis. [x]? yes  []? no   Long term goal 2: Pt demo improved overall function by reporting greater than 75% per functional survey score As of 22:  Exam: LEFS 46/80=59% functional    []? yes  [x]? no   Long term goal 3: Pain-free R knee AROM to 0-130° allowing an increase in ADL tolerance. As of 22:  AROM RLE (degrees)  RLE General AROM: Knee 0-135       []?  yes  [x]? no   Long term goal 4: Improve R LE strength  to allow patient to recip stair climb As of 5/20/22:  Strength RLE  Comment: Hip 4/5, knee quad 4/5, 3+ to 4-/5 ham, ankle 4/5  Strength LLE  Comment: Hip 4/5, knee 4+/5, ankle 4/5ankle 4/5   [x]? yes  [x]? no   Long term goal 5: Ambulate with safe/Indep community distances with min to no deviations with equal wt shift. Patient reported increased pain with increased walking community distances.     []? yes  [x]? no       Body structures, Functions, Activity limitations: Decreased functional mobility ,Increased pain,Decreased posture,Decreased ROM,Decreased strength,Decreased balance  Assessment:  Pt making gains toward goals. Pt did not return to PT after cancelling previous 2 appointments and getting a second opinion from another doctor. Patient asked to be discharged at this time. Patient Status:[]? Continue/ Initiate plan of Care                          [x]? Discharge PT. Recommend pt continue with HEP.                            []? Additional visits requested, Please re-certify for additional visits:                                                     Electronically signed by Salina Hennessy PTA on 6/28/2022 at 10:22 AM    Signature: Electronically signed by Megan Kenyon PT on 6/28/2022 at 3:29 PM

## 2023-08-22 ENCOUNTER — TRANSCRIBE ORDERS (OUTPATIENT)
Dept: ADMINISTRATIVE | Age: 58
End: 2023-08-22

## 2023-08-22 DIAGNOSIS — R31.9 HEMATURIA SYNDROME: Primary | ICD-10-CM

## 2023-08-31 ENCOUNTER — HOSPITAL ENCOUNTER (OUTPATIENT)
Dept: CT IMAGING | Age: 58
Discharge: HOME | End: 2023-09-02
Payer: COMMERCIAL

## 2023-08-31 DIAGNOSIS — R31.9 HEMATURIA SYNDROME: ICD-10-CM

## 2023-08-31 PROCEDURE — 6360000004 HC RX CONTRAST MEDICATION: Performed by: NURSE PRACTITIONER

## 2023-08-31 PROCEDURE — 2580000003 HC RX 258: Performed by: NURSE PRACTITIONER

## 2023-08-31 PROCEDURE — 74177 CT ABD & PELVIS W/CONTRAST: CPT

## 2023-08-31 RX ORDER — 0.9 % SODIUM CHLORIDE 0.9 %
50 INTRAVENOUS SOLUTION INTRAVENOUS ONCE
Status: COMPLETED | OUTPATIENT
Start: 2023-08-31 | End: 2023-08-31

## 2023-08-31 RX ADMIN — IOPAMIDOL 75 ML: 612 INJECTION, SOLUTION INTRAVENOUS at 14:47

## 2023-08-31 RX ADMIN — SODIUM CHLORIDE 50 ML: 9 INJECTION, SOLUTION INTRAVENOUS at 14:48

## 2024-08-16 ENCOUNTER — HOSPITAL ENCOUNTER (OUTPATIENT)
Dept: WOMENS IMAGING | Age: 59
End: 2024-08-16
Payer: MEDICAID

## 2024-08-16 VITALS — BODY MASS INDEX: 27.41 KG/M2 | HEIGHT: 70 IN

## 2024-08-16 DIAGNOSIS — Z12.31 ENCOUNTER FOR SCREENING MAMMOGRAM FOR MALIGNANT NEOPLASM OF BREAST: ICD-10-CM

## 2024-08-16 PROCEDURE — 77063 BREAST TOMOSYNTHESIS BI: CPT

## 2025-08-18 ENCOUNTER — TRANSCRIBE ORDERS (OUTPATIENT)
Dept: ADMINISTRATIVE | Age: 60
End: 2025-08-18

## 2025-08-18 DIAGNOSIS — Z12.31 ENCOUNTER FOR SCREENING MAMMOGRAM FOR MALIGNANT NEOPLASM OF BREAST: Primary | ICD-10-CM

## 2025-08-22 ENCOUNTER — HOSPITAL ENCOUNTER (OUTPATIENT)
Dept: WOMENS IMAGING | Age: 60
Discharge: HOME | End: 2025-08-24
Payer: MEDICAID

## 2025-08-22 VITALS — HEIGHT: 70 IN | BODY MASS INDEX: 27.41 KG/M2

## 2025-08-22 DIAGNOSIS — Z12.31 ENCOUNTER FOR SCREENING MAMMOGRAM FOR MALIGNANT NEOPLASM OF BREAST: ICD-10-CM

## 2025-08-22 PROCEDURE — 77063 BREAST TOMOSYNTHESIS BI: CPT
